# Patient Record
Sex: MALE | Race: WHITE | NOT HISPANIC OR LATINO | URBAN - METROPOLITAN AREA
[De-identification: names, ages, dates, MRNs, and addresses within clinical notes are randomized per-mention and may not be internally consistent; named-entity substitution may affect disease eponyms.]

---

## 2017-05-05 ENCOUNTER — APPOINTMENT (RX ONLY)
Dept: URBAN - METROPOLITAN AREA CLINIC 24 | Facility: CLINIC | Age: 47
Setting detail: DERMATOLOGY
End: 2017-05-05

## 2017-05-05 DIAGNOSIS — L82.1 OTHER SEBORRHEIC KERATOSIS: ICD-10-CM

## 2017-05-05 DIAGNOSIS — D18.0 HEMANGIOMA: ICD-10-CM

## 2017-05-05 DIAGNOSIS — D485 NEOPLASM OF UNCERTAIN BEHAVIOR OF SKIN: ICD-10-CM

## 2017-05-05 DIAGNOSIS — D22 MELANOCYTIC NEVI: ICD-10-CM

## 2017-05-05 DIAGNOSIS — L81.4 OTHER MELANIN HYPERPIGMENTATION: ICD-10-CM

## 2017-05-05 DIAGNOSIS — Z87.2 PERSONAL HISTORY OF DISEASES OF THE SKIN AND SUBCUTANEOUS TISSUE: ICD-10-CM

## 2017-05-05 PROBLEM — D48.5 NEOPLASM OF UNCERTAIN BEHAVIOR OF SKIN: Status: ACTIVE | Noted: 2017-05-05

## 2017-05-05 PROBLEM — F41.9 ANXIETY DISORDER, UNSPECIFIED: Status: ACTIVE | Noted: 2017-05-05

## 2017-05-05 PROBLEM — L55.1 SUNBURN OF SECOND DEGREE: Status: ACTIVE | Noted: 2017-05-05

## 2017-05-05 PROBLEM — D18.01 HEMANGIOMA OF SKIN AND SUBCUTANEOUS TISSUE: Status: ACTIVE | Noted: 2017-05-05

## 2017-05-05 PROBLEM — F32.9 MAJOR DEPRESSIVE DISORDER, SINGLE EPISODE, UNSPECIFIED: Status: ACTIVE | Noted: 2017-05-05

## 2017-05-05 PROBLEM — I10 ESSENTIAL (PRIMARY) HYPERTENSION: Status: ACTIVE | Noted: 2017-05-05

## 2017-05-05 PROBLEM — D22.5 MELANOCYTIC NEVI OF TRUNK: Status: ACTIVE | Noted: 2017-05-05

## 2017-05-05 PROBLEM — L85.3 XEROSIS CUTIS: Status: ACTIVE | Noted: 2017-05-05

## 2017-05-05 PROBLEM — L29.8 OTHER PRURITUS: Status: ACTIVE | Noted: 2017-05-05

## 2017-05-05 PROBLEM — L70.0 ACNE VULGARIS: Status: ACTIVE | Noted: 2017-05-05

## 2017-05-05 PROCEDURE — 11100: CPT

## 2017-05-05 PROCEDURE — ? COUNSELING

## 2017-05-05 PROCEDURE — ? BIOPSY BY SHAVE METHOD

## 2017-05-05 PROCEDURE — ? OTHER

## 2017-05-05 PROCEDURE — 99213 OFFICE O/P EST LOW 20 MIN: CPT | Mod: 25

## 2017-05-05 PROCEDURE — 11101: CPT

## 2017-05-05 ASSESSMENT — LOCATION ZONE DERM
LOCATION ZONE: TRUNK
LOCATION ZONE: NECK
LOCATION ZONE: FACE

## 2017-05-05 ASSESSMENT — LOCATION SIMPLE DESCRIPTION DERM
LOCATION SIMPLE: LEFT UPPER BACK
LOCATION SIMPLE: POSTERIOR NECK
LOCATION SIMPLE: LEFT CHEEK
LOCATION SIMPLE: ABDOMEN

## 2017-05-05 ASSESSMENT — LOCATION DETAILED DESCRIPTION DERM
LOCATION DETAILED: LEFT SUPERIOR LATERAL UPPER BACK
LOCATION DETAILED: RIGHT LATERAL ABDOMEN
LOCATION DETAILED: LEFT LATERAL TRAPEZIAL NECK
LOCATION DETAILED: LEFT RIB CAGE
LOCATION DETAILED: LEFT MID-UPPER BACK
LOCATION DETAILED: LEFT INFERIOR CENTRAL MALAR CHEEK

## 2017-05-05 NOTE — PROCEDURE: BIOPSY BY SHAVE METHOD
Billing Type: Third-Party Bill
Detail Level: Detailed
Biopsy Type: H and E
Type Of Destruction Used: Curettage
Additional Anesthesia Volume In Cc (Will Not Render If 0): 0
Cryotherapy Text: The wound bed was treated with cryotherapy after the biopsy was performed.
Curettage Text: The wound bed was treated with curettage after the biopsy was performed.
Biopsy Method: Dermablade
Bill For Surgical Tray: no
Silver Nitrate Text: The wound bed was treated with silver nitrate after the biopsy was performed.
Anesthesia Type: 1% lidocaine with 1:100,000 epinephrine and a 1:6 solution of 8.4% sodium bicarbonate
Hemostasis: Aluminum Chloride
Anesthesia Volume In Cc: 0.5
Dressing: bandage
Electrodesiccation Text: The wound bed was treated with electrodesiccation after the biopsy was performed.
Wound Care: Petrolatum
Electrodesiccation And Curettage Text: The wound bed was treated with electrodesiccation and curettage after the biopsy was performed.

## 2017-05-05 NOTE — PROCEDURE: OTHER
Other (Free Text): Monitoring nevi on left forearm\\n\\nPhoto of right lower back
Detail Level: Simple
Note Text (......Xxx Chief Complaint.): This diagnosis correlates with the

## 2017-07-06 ENCOUNTER — HOSPITAL ENCOUNTER (OUTPATIENT)
Dept: OCCUPATIONAL MEDICINE | Age: 47
Discharge: HOME | End: 2017-07-06

## 2017-07-06 DIAGNOSIS — T14.90XA INJURY, OTHER AND UNSPECIFIED, UNSPECIFIED SITE: ICD-10-CM

## 2017-10-10 ENCOUNTER — ANESTHESIA EVENT (OUTPATIENT)
Dept: SURGERY | Age: 47
End: 2017-10-10
Payer: OTHER MISCELLANEOUS

## 2017-10-11 ENCOUNTER — APPOINTMENT (OUTPATIENT)
Dept: GENERAL RADIOLOGY | Age: 47
End: 2017-10-11
Attending: EMERGENCY MEDICINE
Payer: OTHER MISCELLANEOUS

## 2017-10-11 ENCOUNTER — HOSPITAL ENCOUNTER (INPATIENT)
Age: 47
LOS: 2 days | Discharge: HOME | DRG: 287 | End: 2017-10-13
Attending: EMERGENCY MEDICINE | Admitting: INTERNAL MEDICINE
Payer: COMMERCIAL

## 2017-10-11 ENCOUNTER — ANESTHESIA (OUTPATIENT)
Dept: SURGERY | Age: 47
End: 2017-10-11
Payer: OTHER MISCELLANEOUS

## 2017-10-11 ENCOUNTER — HOSPITAL ENCOUNTER (OUTPATIENT)
Age: 47
Setting detail: OUTPATIENT SURGERY
Discharge: STILL ADMITTED OR ADMINISTRATIVE | End: 2017-10-11
Attending: ORTHOPAEDIC SURGERY | Admitting: ORTHOPAEDIC SURGERY
Payer: OTHER MISCELLANEOUS

## 2017-10-11 VITALS
RESPIRATION RATE: 15 BRPM | OXYGEN SATURATION: 99 % | SYSTOLIC BLOOD PRESSURE: 107 MMHG | TEMPERATURE: 96.8 F | WEIGHT: 220 LBS | BODY MASS INDEX: 30.68 KG/M2 | HEART RATE: 86 BPM | DIASTOLIC BLOOD PRESSURE: 60 MMHG

## 2017-10-11 DIAGNOSIS — R94.31 ABNORMAL EKG: Primary | ICD-10-CM

## 2017-10-11 PROBLEM — I46.9 ASYSTOLE (HCC): Status: ACTIVE | Noted: 2017-10-11

## 2017-10-11 PROBLEM — I10 HYPERTENSION: Chronic | Status: ACTIVE | Noted: 2017-10-11

## 2017-10-11 PROBLEM — I20.0 UNSTABLE ANGINA (HCC): Status: ACTIVE | Noted: 2017-10-11

## 2017-10-11 PROBLEM — F41.9 ANXIETY: Chronic | Status: ACTIVE | Noted: 2017-10-11

## 2017-10-11 LAB
ANION GAP SERPL CALC-SCNC: 10 MMOL/L (ref 7–16)
ATRIAL RATE: 77 BPM
ATRIAL RATE: 86 BPM
BUN SERPL-MCNC: 15 MG/DL (ref 6–23)
CALCIUM SERPL-MCNC: 8.6 MG/DL (ref 8.3–10.4)
CALCULATED P AXIS, ECG09: 65 DEGREES
CALCULATED P AXIS, ECG09: 68 DEGREES
CALCULATED R AXIS, ECG10: 33 DEGREES
CALCULATED R AXIS, ECG10: 35 DEGREES
CALCULATED T AXIS, ECG11: -103 DEGREES
CALCULATED T AXIS, ECG11: 84 DEGREES
CHLORIDE SERPL-SCNC: 110 MMOL/L (ref 98–107)
CO2 SERPL-SCNC: 24 MMOL/L (ref 21–32)
CREAT SERPL-MCNC: 1.01 MG/DL (ref 0.8–1.5)
DIAGNOSIS, 93000: NORMAL
DIAGNOSIS, 93000: NORMAL
ERYTHROCYTE [DISTWIDTH] IN BLOOD BY AUTOMATED COUNT: 12.9 % (ref 11.9–14.6)
GLUCOSE SERPL-MCNC: 120 MG/DL (ref 65–100)
HCT VFR BLD AUTO: 43 % (ref 41.1–50.3)
HGB BLD-MCNC: 14.9 G/DL (ref 13.6–17.2)
MAGNESIUM SERPL-MCNC: 2.3 MG/DL (ref 1.8–2.4)
MCH RBC QN AUTO: 33.8 PG (ref 26.1–32.9)
MCHC RBC AUTO-ENTMCNC: 34.7 G/DL (ref 31.4–35)
MCV RBC AUTO: 97.5 FL (ref 79.6–97.8)
P-R INTERVAL, ECG05: 136 MS
P-R INTERVAL, ECG05: 142 MS
PLATELET # BLD AUTO: 178 K/UL (ref 150–450)
PMV BLD AUTO: 10.4 FL (ref 10.8–14.1)
POTASSIUM SERPL-SCNC: 3.7 MMOL/L (ref 3.5–5.1)
Q-T INTERVAL, ECG07: 386 MS
Q-T INTERVAL, ECG07: 388 MS
QRS DURATION, ECG06: 104 MS
QRS DURATION, ECG06: 104 MS
QTC CALCULATION (BEZET), ECG08: 439 MS
QTC CALCULATION (BEZET), ECG08: 461 MS
RBC # BLD AUTO: 4.41 M/UL (ref 4.23–5.67)
SODIUM SERPL-SCNC: 144 MMOL/L (ref 136–145)
TROPONIN I SERPL-MCNC: 0.03 NG/ML (ref 0.02–0.05)
VENTRICULAR RATE, ECG03: 77 BPM
VENTRICULAR RATE, ECG03: 86 BPM
WBC # BLD AUTO: 8.2 K/UL (ref 4.3–11.1)

## 2017-10-11 PROCEDURE — 77030000032 HC CUF TRNQT ZIMM -B: Performed by: ORTHOPAEDIC SURGERY

## 2017-10-11 PROCEDURE — 77030003666 HC NDL SPINAL BD -A: Performed by: ORTHOPAEDIC SURGERY

## 2017-10-11 PROCEDURE — 74011250636 HC RX REV CODE- 250/636: Performed by: INTERNAL MEDICINE

## 2017-10-11 PROCEDURE — 83735 ASSAY OF MAGNESIUM: CPT | Performed by: EMERGENCY MEDICINE

## 2017-10-11 PROCEDURE — 74011636320 HC RX REV CODE- 636/320: Performed by: INTERNAL MEDICINE

## 2017-10-11 PROCEDURE — 74011250636 HC RX REV CODE- 250/636

## 2017-10-11 PROCEDURE — 80048 BASIC METABOLIC PNL TOTAL CA: CPT | Performed by: EMERGENCY MEDICINE

## 2017-10-11 PROCEDURE — 74011250637 HC RX REV CODE- 250/637: Performed by: ANESTHESIOLOGY

## 2017-10-11 PROCEDURE — 77030006668 HC BLD SHV MENSCS STRY -B: Performed by: ORTHOPAEDIC SURGERY

## 2017-10-11 PROCEDURE — 77030018836 HC SOL IRR NACL ICUM -A: Performed by: ORTHOPAEDIC SURGERY

## 2017-10-11 PROCEDURE — C1894 INTRO/SHEATH, NON-LASER: HCPCS

## 2017-10-11 PROCEDURE — 74011000250 HC RX REV CODE- 250

## 2017-10-11 PROCEDURE — 77030015766

## 2017-10-11 PROCEDURE — 77030019605: Performed by: ORTHOPAEDIC SURGERY

## 2017-10-11 PROCEDURE — B2111ZZ FLUOROSCOPY OF MULTIPLE CORONARY ARTERIES USING LOW OSMOLAR CONTRAST: ICD-10-PCS | Performed by: INTERNAL MEDICINE

## 2017-10-11 PROCEDURE — 74011250636 HC RX REV CODE- 250/636: Performed by: ORTHOPAEDIC SURGERY

## 2017-10-11 PROCEDURE — 74011000250 HC RX REV CODE- 250: Performed by: INTERNAL MEDICINE

## 2017-10-11 PROCEDURE — 77030004534 HC CATH ANGI DX INFN CARD -A

## 2017-10-11 PROCEDURE — 77030029997 HC DEV COM RDL R BND TELE -B

## 2017-10-11 PROCEDURE — 93458 L HRT ARTERY/VENTRICLE ANGIO: CPT

## 2017-10-11 PROCEDURE — 76060000032 HC ANESTHESIA 0.5 TO 1 HR: Performed by: ORTHOPAEDIC SURGERY

## 2017-10-11 PROCEDURE — 76210000063 HC OR PH I REC FIRST 0.5 HR: Performed by: ORTHOPAEDIC SURGERY

## 2017-10-11 PROCEDURE — 74011250637 HC RX REV CODE- 250/637: Performed by: INTERNAL MEDICINE

## 2017-10-11 PROCEDURE — 74011250636 HC RX REV CODE- 250/636: Performed by: ANESTHESIOLOGY

## 2017-10-11 PROCEDURE — 99285 EMERGENCY DEPT VISIT HI MDM: CPT | Performed by: EMERGENCY MEDICINE

## 2017-10-11 PROCEDURE — 85027 COMPLETE CBC AUTOMATED: CPT | Performed by: EMERGENCY MEDICINE

## 2017-10-11 PROCEDURE — 77030002933 HC SUT MCRYL J&J -A: Performed by: ORTHOPAEDIC SURGERY

## 2017-10-11 PROCEDURE — 93005 ELECTROCARDIOGRAM TRACING: CPT | Performed by: ANESTHESIOLOGY

## 2017-10-11 PROCEDURE — C8929 TTE W OR WO FOL WCON,DOPPLER: HCPCS

## 2017-10-11 PROCEDURE — 74011250637 HC RX REV CODE- 250/637: Performed by: PHYSICIAN ASSISTANT

## 2017-10-11 PROCEDURE — 93005 ELECTROCARDIOGRAM TRACING: CPT | Performed by: EMERGENCY MEDICINE

## 2017-10-11 PROCEDURE — 65660000000 HC RM CCU STEPDOWN

## 2017-10-11 PROCEDURE — 76010000138 HC OR TIME 0.5 TO 1 HR: Performed by: ORTHOPAEDIC SURGERY

## 2017-10-11 PROCEDURE — 77030020143 HC AIRWY LARYN INTUB CGAS -A: Performed by: ANESTHESIOLOGY

## 2017-10-11 PROCEDURE — B2151ZZ FLUOROSCOPY OF LEFT HEART USING LOW OSMOLAR CONTRAST: ICD-10-PCS | Performed by: INTERNAL MEDICINE

## 2017-10-11 PROCEDURE — 99152 MOD SED SAME PHYS/QHP 5/>YRS: CPT

## 2017-10-11 PROCEDURE — 84484 ASSAY OF TROPONIN QUANT: CPT | Performed by: PHYSICIAN ASSISTANT

## 2017-10-11 PROCEDURE — 4A023N7 MEASUREMENT OF CARDIAC SAMPLING AND PRESSURE, LEFT HEART, PERCUTANEOUS APPROACH: ICD-10-PCS | Performed by: INTERNAL MEDICINE

## 2017-10-11 PROCEDURE — 71010 XR CHEST PORT: CPT

## 2017-10-11 PROCEDURE — C1769 GUIDE WIRE: HCPCS

## 2017-10-11 RX ORDER — ESMOLOL HYDROCHLORIDE 10 MG/ML
INJECTION INTRAVENOUS AS NEEDED
Status: DISCONTINUED | OUTPATIENT
Start: 2017-10-11 | End: 2017-10-11 | Stop reason: HOSPADM

## 2017-10-11 RX ORDER — SODIUM CHLORIDE, SODIUM LACTATE, POTASSIUM CHLORIDE, CALCIUM CHLORIDE 600; 310; 30; 20 MG/100ML; MG/100ML; MG/100ML; MG/100ML
75 INJECTION, SOLUTION INTRAVENOUS CONTINUOUS
Status: DISCONTINUED | OUTPATIENT
Start: 2017-10-11 | End: 2017-10-11 | Stop reason: HOSPADM

## 2017-10-11 RX ORDER — FENTANYL CITRATE 50 UG/ML
25-100 INJECTION, SOLUTION INTRAMUSCULAR; INTRAVENOUS
Status: DISCONTINUED | OUTPATIENT
Start: 2017-10-11 | End: 2017-10-12

## 2017-10-11 RX ORDER — ONDANSETRON 2 MG/ML
4 INJECTION INTRAMUSCULAR; INTRAVENOUS
Status: DISCONTINUED | OUTPATIENT
Start: 2017-10-11 | End: 2017-10-13 | Stop reason: HOSPADM

## 2017-10-11 RX ORDER — PROPOFOL 10 MG/ML
INJECTION, EMULSION INTRAVENOUS AS NEEDED
Status: DISCONTINUED | OUTPATIENT
Start: 2017-10-11 | End: 2017-10-11 | Stop reason: HOSPADM

## 2017-10-11 RX ORDER — DIAZEPAM 5 MG/1
5 TABLET ORAL ONCE
Status: ACTIVE | OUTPATIENT
Start: 2017-10-11 | End: 2017-10-11

## 2017-10-11 RX ORDER — MORPHINE SULFATE 2 MG/ML
2 INJECTION, SOLUTION INTRAMUSCULAR; INTRAVENOUS
Status: DISCONTINUED | OUTPATIENT
Start: 2017-10-11 | End: 2017-10-13 | Stop reason: HOSPADM

## 2017-10-11 RX ORDER — HEPARIN SODIUM 200 [USP'U]/100ML
25 INJECTION, SOLUTION INTRAVENOUS CONTINUOUS
Status: DISCONTINUED | OUTPATIENT
Start: 2017-10-11 | End: 2017-10-11

## 2017-10-11 RX ORDER — VALSARTAN 160 MG/1
160 TABLET ORAL DAILY
Status: DISCONTINUED | OUTPATIENT
Start: 2017-10-11 | End: 2017-10-13 | Stop reason: HOSPADM

## 2017-10-11 RX ORDER — FENTANYL CITRATE 50 UG/ML
INJECTION, SOLUTION INTRAMUSCULAR; INTRAVENOUS AS NEEDED
Status: DISCONTINUED | OUTPATIENT
Start: 2017-10-11 | End: 2017-10-11 | Stop reason: HOSPADM

## 2017-10-11 RX ORDER — CITALOPRAM 10 MG/1
10 TABLET ORAL DAILY
Status: DISCONTINUED | OUTPATIENT
Start: 2017-10-11 | End: 2017-10-13 | Stop reason: HOSPADM

## 2017-10-11 RX ORDER — MIDAZOLAM HYDROCHLORIDE 1 MG/ML
2 INJECTION, SOLUTION INTRAMUSCULAR; INTRAVENOUS ONCE
Status: DISCONTINUED | OUTPATIENT
Start: 2017-10-11 | End: 2017-10-11 | Stop reason: HOSPADM

## 2017-10-11 RX ORDER — CEFAZOLIN SODIUM IN 0.9 % NACL 2 G/50 ML
2 INTRAVENOUS SOLUTION, PIGGYBACK (ML) INTRAVENOUS ONCE
Status: COMPLETED | OUTPATIENT
Start: 2017-10-11 | End: 2017-10-11

## 2017-10-11 RX ORDER — ACETAMINOPHEN 325 MG/1
650 TABLET ORAL
Status: DISCONTINUED | OUTPATIENT
Start: 2017-10-11 | End: 2017-10-13 | Stop reason: HOSPADM

## 2017-10-11 RX ORDER — MIDAZOLAM HYDROCHLORIDE 1 MG/ML
2 INJECTION, SOLUTION INTRAMUSCULAR; INTRAVENOUS
Status: DISCONTINUED | OUTPATIENT
Start: 2017-10-11 | End: 2017-10-11 | Stop reason: HOSPADM

## 2017-10-11 RX ORDER — LIDOCAINE HYDROCHLORIDE 20 MG/ML
1-20 INJECTION, SOLUTION INFILTRATION; PERINEURAL ONCE
Status: COMPLETED | OUTPATIENT
Start: 2017-10-11 | End: 2017-10-11

## 2017-10-11 RX ORDER — IBUPROFEN 400 MG/1
800 TABLET ORAL
Status: DISCONTINUED | OUTPATIENT
Start: 2017-10-11 | End: 2017-10-13 | Stop reason: HOSPADM

## 2017-10-11 RX ORDER — ACETAMINOPHEN 500 MG
1000 TABLET ORAL ONCE
Status: COMPLETED | OUTPATIENT
Start: 2017-10-11 | End: 2017-10-11

## 2017-10-11 RX ORDER — NALOXONE HYDROCHLORIDE 0.4 MG/ML
0.2 INJECTION, SOLUTION INTRAMUSCULAR; INTRAVENOUS; SUBCUTANEOUS AS NEEDED
Status: DISCONTINUED | OUTPATIENT
Start: 2017-10-11 | End: 2017-10-11 | Stop reason: HOSPADM

## 2017-10-11 RX ORDER — SODIUM CHLORIDE 0.9 % (FLUSH) 0.9 %
5-10 SYRINGE (ML) INJECTION AS NEEDED
Status: DISCONTINUED | OUTPATIENT
Start: 2017-10-11 | End: 2017-10-11 | Stop reason: HOSPADM

## 2017-10-11 RX ORDER — FENTANYL CITRATE 50 UG/ML
100 INJECTION, SOLUTION INTRAMUSCULAR; INTRAVENOUS ONCE
Status: DISCONTINUED | OUTPATIENT
Start: 2017-10-11 | End: 2017-10-11 | Stop reason: HOSPADM

## 2017-10-11 RX ORDER — HYDROMORPHONE HYDROCHLORIDE 2 MG/ML
0.5 INJECTION, SOLUTION INTRAMUSCULAR; INTRAVENOUS; SUBCUTANEOUS
Status: DISCONTINUED | OUTPATIENT
Start: 2017-10-11 | End: 2017-10-11 | Stop reason: HOSPADM

## 2017-10-11 RX ORDER — GUAIFENESIN 100 MG/5ML
81 LIQUID (ML) ORAL ONCE
Status: COMPLETED | OUTPATIENT
Start: 2017-10-11 | End: 2017-10-11

## 2017-10-11 RX ORDER — GUAIFENESIN 100 MG/5ML
81 LIQUID (ML) ORAL DAILY
Status: DISCONTINUED | OUTPATIENT
Start: 2017-10-12 | End: 2017-10-13 | Stop reason: HOSPADM

## 2017-10-11 RX ORDER — AMLODIPINE BESYLATE 5 MG/1
5 TABLET ORAL DAILY
Status: DISCONTINUED | OUTPATIENT
Start: 2017-10-12 | End: 2017-10-13 | Stop reason: HOSPADM

## 2017-10-11 RX ORDER — MIDAZOLAM HYDROCHLORIDE 1 MG/ML
1-6 INJECTION, SOLUTION INTRAMUSCULAR; INTRAVENOUS
Status: DISCONTINUED | OUTPATIENT
Start: 2017-10-11 | End: 2017-10-12

## 2017-10-11 RX ORDER — LIDOCAINE HYDROCHLORIDE 10 MG/ML
0.1 INJECTION INFILTRATION; PERINEURAL AS NEEDED
Status: DISCONTINUED | OUTPATIENT
Start: 2017-10-11 | End: 2017-10-11 | Stop reason: HOSPADM

## 2017-10-11 RX ORDER — NITROGLYCERIN 0.4 MG/1
0.4 TABLET SUBLINGUAL
Status: DISCONTINUED | OUTPATIENT
Start: 2017-10-11 | End: 2017-10-13 | Stop reason: HOSPADM

## 2017-10-11 RX ORDER — LIDOCAINE HYDROCHLORIDE 20 MG/ML
INJECTION, SOLUTION EPIDURAL; INFILTRATION; INTRACAUDAL; PERINEURAL AS NEEDED
Status: DISCONTINUED | OUTPATIENT
Start: 2017-10-11 | End: 2017-10-11 | Stop reason: HOSPADM

## 2017-10-11 RX ORDER — SODIUM CHLORIDE 0.9 % (FLUSH) 0.9 %
5-10 SYRINGE (ML) INJECTION EVERY 8 HOURS
Status: DISCONTINUED | OUTPATIENT
Start: 2017-10-11 | End: 2017-10-11 | Stop reason: HOSPADM

## 2017-10-11 RX ORDER — ATROPINE SULFATE 0.4 MG/ML
INJECTION, SOLUTION ENDOTRACHEAL; INTRAMEDULLARY; INTRAMUSCULAR; INTRAVENOUS; SUBCUTANEOUS AS NEEDED
Status: DISCONTINUED | OUTPATIENT
Start: 2017-10-11 | End: 2017-10-11 | Stop reason: HOSPADM

## 2017-10-11 RX ORDER — OXYCODONE HYDROCHLORIDE 5 MG/1
5 TABLET ORAL
Status: DISCONTINUED | OUTPATIENT
Start: 2017-10-11 | End: 2017-10-11 | Stop reason: HOSPADM

## 2017-10-11 RX ORDER — EPINEPHRINE 1 MG/ML
INJECTION, SOLUTION, CONCENTRATE INTRAVENOUS AS NEEDED
Status: DISCONTINUED | OUTPATIENT
Start: 2017-10-11 | End: 2017-10-11 | Stop reason: HOSPADM

## 2017-10-11 RX ORDER — HYDROCODONE BITARTRATE AND ACETAMINOPHEN 5; 325 MG/1; MG/1
1 TABLET ORAL
Status: DISCONTINUED | OUTPATIENT
Start: 2017-10-11 | End: 2017-10-13 | Stop reason: HOSPADM

## 2017-10-11 RX ADMIN — ASPIRIN 81 MG 81 MG: 81 TABLET ORAL at 09:22

## 2017-10-11 RX ADMIN — PERFLUTREN 1 ML: 6.52 INJECTION, SUSPENSION INTRAVENOUS at 13:00

## 2017-10-11 RX ADMIN — ATROPINE SULFATE 0.4 MG: 0.4 INJECTION, SOLUTION ENDOTRACHEAL; INTRAMEDULLARY; INTRAMUSCULAR; INTRAVENOUS; SUBCUTANEOUS at 07:06

## 2017-10-11 RX ADMIN — FENTANYL CITRATE 50 MCG: 50 INJECTION, SOLUTION INTRAMUSCULAR; INTRAVENOUS at 06:53

## 2017-10-11 RX ADMIN — ESMOLOL HYDROCHLORIDE 10 MG: 10 INJECTION INTRAVENOUS at 07:14

## 2017-10-11 RX ADMIN — MIDAZOLAM HYDROCHLORIDE 2 MG: 1 INJECTION, SOLUTION INTRAMUSCULAR; INTRAVENOUS at 09:51

## 2017-10-11 RX ADMIN — IBUPROFEN 800 MG: 400 TABLET, FILM COATED ORAL at 22:47

## 2017-10-11 RX ADMIN — PROPOFOL 100 MG: 10 INJECTION, EMULSION INTRAVENOUS at 06:54

## 2017-10-11 RX ADMIN — IBUPROFEN 800 MG: 400 TABLET, FILM COATED ORAL at 14:48

## 2017-10-11 RX ADMIN — ACETAMINOPHEN 650 MG: 325 TABLET ORAL at 20:39

## 2017-10-11 RX ADMIN — ACETAMINOPHEN 1000 MG: 500 TABLET, FILM COATED ORAL at 06:08

## 2017-10-11 RX ADMIN — PROPOFOL 200 MG: 10 INJECTION, EMULSION INTRAVENOUS at 06:53

## 2017-10-11 RX ADMIN — HEPARIN SODIUM 2 ML: 10000 INJECTION, SOLUTION INTRAVENOUS; SUBCUTANEOUS at 09:56

## 2017-10-11 RX ADMIN — FENTANYL CITRATE 50 MCG: 50 INJECTION, SOLUTION INTRAMUSCULAR; INTRAVENOUS at 09:51

## 2017-10-11 RX ADMIN — LIDOCAINE HYDROCHLORIDE 50 MG: 20 INJECTION, SOLUTION EPIDURAL; INFILTRATION; INTRACAUDAL; PERINEURAL at 06:53

## 2017-10-11 RX ADMIN — IOPAMIDOL 85 ML: 755 INJECTION, SOLUTION INTRAVENOUS at 10:03

## 2017-10-11 RX ADMIN — CEFAZOLIN 2 G: 1 INJECTION, POWDER, FOR SOLUTION INTRAMUSCULAR; INTRAVENOUS; PARENTERAL at 06:42

## 2017-10-11 RX ADMIN — SODIUM CHLORIDE, SODIUM LACTATE, POTASSIUM CHLORIDE, AND CALCIUM CHLORIDE 75 ML/HR: 600; 310; 30; 20 INJECTION, SOLUTION INTRAVENOUS at 06:08

## 2017-10-11 RX ADMIN — HEPARIN SODIUM 25 ML/HR: 200 INJECTION, SOLUTION INTRAVENOUS at 09:40

## 2017-10-11 RX ADMIN — LIDOCAINE HYDROCHLORIDE 60 MG: 20 INJECTION, SOLUTION INFILTRATION; PERINEURAL at 09:50

## 2017-10-11 RX ADMIN — EPINEPHRINE 1 MG: 1 INJECTION, SOLUTION, CONCENTRATE INTRAVENOUS at 07:06

## 2017-10-11 NOTE — H&P
[facility] CARDIOLOGY History &Physical                   Primary Care Physician: Dr. Jb Gill    Admitting Physician: Dr. Ke Pruett:     Patient is a 56 y.o. male who presented for knee arthroscopy today. During the procedure, it was noted that his knee kicked. He was then noted to be bradycardic and then had asystole. Chest compressions were started. He was given medication by anesthesia and heart rate improved. Chest compressions were stopped. The case was stopped and he was taken to recovery. 12 lead EKG was obtained that showed septal/anterior ST elevation. He was transported to the ER. He denied any chest pain or dyspnea. Repeat EKG was improved but continued to show mild ST elevation. Troponin is pending. He has no prior history of CAD. He reports a normal stress test approximately 6-7 years ago. He does have FH of premature CAD.        Past history includes HTN, anxiety    Past Medical History:   Diagnosis Date    Anxiety     managed well with Celexa    Former cigarette smoker     GERD (gastroesophageal reflux disease)     PRN OTC     History of kidney stones     X3- naturally passed    Hypertension     controlled well with meds    Left knee pain     Psychiatric disorder     Seasonal allergic rhinitis     Allegra PRN    Unspecified sleep apnea     complaint with CPAP      Past Surgical History:   Procedure Laterality Date    HX COLONOSCOPY      HX ENDOSCOPY      HX HEENT      adenoid, polyps from throat removed     HX TONSILLECTOMY  at age of 7      Allergies   Allergen Reactions    Pcn [Penicillins] Rash     Social History   Substance Use Topics    Smoking status: Former Smoker     Packs/day: 1.00     Years: 20.00    Smokeless tobacco: Never Used      Comment: quit 2015    Alcohol use Yes      Comment: socially      FH:   Family History   Problem Relation Age of Onset    Hypertension Mother     Arthritis-osteo Mother     Diabetes Father     Heart Disease Father     No Known Problems Sister     No Known Problems Brother           Review of Systems   Constitution: Negative for chills, fever, weakness, malaise/fatigue, weight gain and weight loss. HENT: Negative for ear pain, hearing loss, nosebleeds, sore throat and tinnitus. Eyes: Negative for blurred vision, vision loss in left eye and vision loss in right eye. Cardiovascular: Negative for chest pain, dyspnea on exertion, leg swelling, near-syncope, orthopnea, palpitations, paroxysmal nocturnal dyspnea and syncope. Respiratory: Negative for cough, hemoptysis, shortness of breath, sputum production and wheezing. Endocrine: Negative for cold intolerance, heat intolerance and polydipsia. Hematologic/Lymphatic: Does not bruise/bleed easily. Skin: Negative for color change and rash. Musculoskeletal: Negative for back pain, joint pain, joint swelling and myalgias. Knee pain   Gastrointestinal: Negative for abdominal pain, constipation, diarrhea, dysphagia, heartburn, hematemesis, melena, nausea and vomiting. Genitourinary: Negative for dysuria, frequency, hematuria and urgency. Neurological: Negative for difficulty with concentration, dizziness, headaches, light-headedness, numbness, paresthesias, seizures and vertigo. Psychiatric/Behavioral: Negative for altered mental status and depression.          Objective:       Visit Vitals    /80    Pulse 81    Temp 97.7 °F (36.5 °C)    Resp 16    Ht 5' 11\" (1.803 m)    Wt 99.8 kg (220 lb)    SpO2 94%    BMI 30.68 kg/m2       Physical Exam:  General: Well Developed, Well Nourished, No Acute Distress  HEENT: pupils equal and round, no abnormalities noted  Neck: supple, no JVD  Heart: S1S2 with RRR  Lungs: Clear throughout auscultation bilaterally  Abd: soft, nontender, nondistended, with good bowel sounds  Ext: warm, no edema, calves supple/nontender, pulses 2+ bilaterally  Skin: warm and dry  Psychiatric: Normal mood and affect  Neurologic: Alert and oriented X 3      ECG: sinus rhythm, ST elevation in septal/anterior leads    Data Review: pending    CXR: Pending    Assessment/Plan:     Abnormal EKG (10/11/2017)  Concerning for ischemia, will proceed with LHC/possible PCI this morning    Active Problems:    Asystole ([address] (10/11/2017)  This occurred in the OR under anesthesia, ?vagal episode, LHC as above, monitor on telemetry      Anxiety (10/11/2017)    Continue Celexa      Hypertension (10/11/2017)  Continue home meds and monitor     Knee pain  Arthroscopy case was stopped given above events, will readdress after [address] PA-C  10/11/2017  8:15 AM

## 2017-10-11 NOTE — IP AVS SNAPSHOT
[address] 
[phone] Patient: Chris Lutz MRN: [id number] SUQ:[birth year] Current Discharge Medication List  
  
CONTINUE these medications which have NOT CHANGED Dose & Instructions Dispensing Information Comments Morning Noon Evening Bedtime ADVIL 200 mg tablet Generic drug:  ibuprofen Your next dose is:  As needed Dose:  200 mg Take 200 mg by mouth as needed for Pain. Refills:  0 ALLEGRA 180 mg tablet Generic drug:  fexofenadine Your next dose is:  As needed Take  by mouth daily as needed for Allergies. Refills:  0  
     
   
   
   
  
 citalopram 10 mg tablet Commonly known as:  Hernandez Angeau Your next dose is:  10-14 Dose:  10 mg Take 10 mg by mouth daily. Indications: anxiety Refills:  0 EXFORGE 5-160 mg per tablet Generic drug:  amLODIPine-valsartan Your next dose is:  10-14 Dose:  1 Tab Take 1 Tab by mouth daily. Indications: hypertension Refills:  0

## 2017-10-11 NOTE — PROGRESS NOTES
TRANSFER - OUT REPORT:    Verbal report given to Jose Jacob RN(name) on Rosy Tolbert  being transferred to tele(unit) for routine progression of care       Report consisted of patients Situation, Background, Assessment and   Recommendations(SBAR). Information from the following report(s) Procedure Summary was reviewed with the receiving nurse. Lines:   Peripheral IV 10/11/17 Right Hand (Active)   Site Assessment Clean, dry, & intact 10/11/2017  7:44 AM   Phlebitis Assessment 0 10/11/2017  7:44 AM   Infiltration Assessment 0 10/11/2017  7:44 AM   Dressing Status Clean, dry, & intact 10/11/2017  7:44 AM   Dressing Type Tape;Transparent 10/11/2017  7:44 AM   Hub Color/Line Status Pink; Infusing 10/11/2017  7:44 AM        Opportunity for questions and clarification was provided.       Patient transported with:   [employer]

## 2017-10-11 NOTE — ANESTHESIA POSTPROCEDURE EVALUATION
Post-Anesthesia Evaluation and Assessment    Patient: Kike Bravo MRN: [id number]  SSN: [id number]    YOB: 1970  Age: 56 y.o. Sex: male       Cardiovascular Function/Vital Signs  Visit Vitals    /57    Pulse 90    Temp 36 °C (96.8 °F)    Resp 16    Wt 99.8 kg (220 lb)    SpO2 97%    BMI 30.68 kg/m2       Patient is status post general anesthesia for Procedure(s):  LEFT KNEE DIAGNOSTIC ARTHROSCOPY   . Nausea/Vomiting: None    Postoperative hydration reviewed and adequate. Pain:  Pain Scale 1: (P) Numeric (0 - 10) (10/11/17 0737)  Pain Intensity 1: (P) 4 (10/11/17 0737)   Managed    Neurological Status:   Neuro (WDL): Within Defined Limits (10/11/17 0600)   At baseline    Mental Status and Level of Consciousness: Arousable    Pulmonary Status:   O2 Device: (P) Oxygen mask (10/11/17 0734)   Adequate oxygenation and airway patent    Complications related to anesthesia: ACLS in [address] assessment completed. Emergently to ER. ER physician and cardiologist aware.      Signed By: Pinky Gomez MD     October 11, 2017

## 2017-10-11 NOTE — H&P
Outpatient Surgery History and Physical:  Brady Bustamante was seen and examined. CHIEF COMPLAINT:   Left knee pain. PE:     Visit Vitals    /87 (BP 1 Location: Right arm, BP Patient Position: Sitting)    Pulse 70    Temp 98.6 °F (37 °C)    Resp 18    Wt 99.8 kg (220 lb)    SpO2 95%    BMI 30.68 kg/m2       Heart:   Regular rhythm      Lungs:  Are clear      Past Medical History: There are no active problems to display for this patient. Surgical History:   Past Surgical History:   Procedure Laterality Date    HX COLONOSCOPY      HX ENDOSCOPY      HX HEENT      adenoid, polyps from throat removed     HX TONSILLECTOMY  at age of 7       Social History: Patient  reports that he has quit smoking. He has a 20.00 pack-year smoking history. He has never used smokeless tobacco. He reports that he drinks alcohol. He reports that he does not use illicit drugs. Family History:   Family History   Problem Relation Age of Onset    Hypertension Mother     Arthritis-osteo Mother     Diabetes Father     Heart Disease Father     No Known Problems Sister     No Known Problems Brother        Allergies: Reviewed per EMR  Allergies   Allergen Reactions    Pcn [Penicillins] Rash       Medications:    No current facility-administered medications on file prior to encounter. Current Outpatient Prescriptions on File Prior to Encounter   Medication Sig    ibuprofen (ADVIL) 200 mg tablet Take 200 mg by mouth as needed for Pain.  fexofenadine (ALLEGRA) 180 mg tablet Take  by mouth daily as needed for Allergies.  citalopram (CELEXA) 10 mg tablet Take 10 mg by mouth daily. Indications: anxiety    amLODIPine-valsartan (EXFORGE) 5-160 mg per tablet Take 1 Tab by mouth daily. Indications: hypertension       The surgery is planned for the left knee. History and physical has been reviewed. The patient has been examined.  There have been no significant clinical changes since the completion of the originally dated History and Physical.  Patient identified by surgeon; surgical site was confirmed by patient and surgeon. The patient is here today for outpatient surgery. I have examined the patient, no changes are noted in the patient's medical status. Necessity for the procedure/care is still present and the history and physical above is current. See the office notes for the full long term history of the problem. Please see the recent office notes for the musculoskeletal examination.     Signed By: Army Jolynn MD     October 11, 2017 6:34 AM

## 2017-10-11 NOTE — ROUTINE PROCESS
TRANSFER - OUT REPORT:    Verbal report given to Carlyle Cuba RN (name) on Nataly Nose  being transferred to [address] [place] [address] (unit) for routine progression of care       Report consisted of patients Situation, Background, Assessment and   Recommendations(SBAR). Information from the following report(s) Procedure Summary, MAR and Recent Results was reviewed with the receiving nurse. Lines:   Peripheral IV 10/11/17 Right Hand (Active)   Site Assessment Clean, dry, & intact 10/11/2017  7:44 AM   Phlebitis Assessment 0 10/11/2017  7:44 AM   Infiltration Assessment 0 10/11/2017  7:44 AM   Dressing Status Clean, dry, & intact 10/11/2017  7:44 AM   Dressing Type Tape;Transparent 10/11/2017  7:44 AM   Hub Color/Line Status Pink; Infusing 10/11/2017  7:44 AM        Opportunity for questions and clarification was provided.       Patient transported with:   [facility] w/ Dr Cevallos Sport cath  R band to right radial w/ 13 mls at 1010  Versed 2mg IV  Fentanyl 50 mcg IV

## 2017-10-11 NOTE — ED PROVIDER NOTES
52-year-old male was here for knee scope. As surgery was starting patient had episode of bradycardia and hypotension. Became asystolic. He was given epi atropine and bicarbonate. Recovered. EKG abnormal periods patient sent from the OR to the ED for cardiology evaluation    At this time patient is awake alert oriented answering questions falling commands. Patient movements are intact. Extraocular movements are intact. Respirations are easy and nonlabored. Radial Pulses are equal.     [address] is at the bedside. Plan is take the patient of the Lab for further evaluation.     Lewis Burk MD; 10/11/2017 @8:02 AM===========================================

## 2017-10-11 NOTE — PROGRESS NOTES
TRANSFER - IN REPORT:    Verbal report received from Donn Grove RN(name) on Annie Anna  being received from cath lab(unit) for routine progression of care      Report consisted of patients Situation, Background, Assessment and   Recommendations(SBAR). Information from the following report(s) Procedure Summary was reviewed with the receiving nurse. Opportunity for questions and clarification was provided. Assessment completed upon patients arrival to unit and care assumed.

## 2017-10-11 NOTE — OP NOTES
Operative Note    10/11/2017     Preoperative diagnosis:  Other tear of medial meniscus of left knee, unspecified whether old or current tear, initial encounter [S86.504D]    Postoperative diagnosis: Left Knee    Surgeon(s) and Role:     * Denver Grapes, MD - Primary     Assistant: none      Anesthesia: General    Antibiotics: Ancef 2 gram IV    Procedures:  Procedure(s):  LEFT KNEE DIAGNOSTIC ARTHROSCOPY   [address]    Findings:  1. EUA -   - lachman's and - pivot shift. Stable to varus and valgus. 2. PFJ - Normal patella and trochlea. The medial and lateral gutters appeared normal. Initial observation of the notch did not show any other abnormality but prior to being able to probe the ACL and visualize more of the joint the case had to be stopped. Indications / Consent:   this is a patient with symptoms compatible with a medial meniscus tear. After previous discussions and treatments using both conservative and/or non-operative treatment options the patient elected to proceed with surgery due to continued symptoms. A review of the risks and benefits, including but not limited to infection, stiffness, injury to nerves and vessels, DVT, PE, MI, need for further operations and other anesthesia related risks was performed with the patient. After this review and the review of the likely outcome and potential complications of the procedure, preoperative verbal and written consents were obtained. The operative procedure and postoperative course were discussed with the patient in detail and the extremity was marked by the patient and myself. Procedure:     the patient was given their anesthetic, placed in a supine position, an EUA was performed and noted above. A lateral post was placed adjacent to the operative leg. The contralateral leg was padded appropriately and lay on the operating table. The surgical leg was prepped with ChloraPrep and draped in the standard fashion.      Prior to the beginning of the procedure, a time-out was performed for correct surgical site identification as was marked during the pre-operative meeting. This was confirmed using the written consent and history/physical. Time-out for antibiotic dosing, timing and selection was also performed. An esmarch was used to exsanguinate the leg and the tourniquet was inflated to 275 mmhg. An incision was made laterally and the scope was introduced anterolaterally. The superior pouch was visualized and the scope was used to look into the medial and lateral gutters without notable abnormality. Pictures of the patella and the trochlea were taken. The scope was then placed into the notch. An anteromedial portal was developed with the use of spinal needle localization. His knee kicked and I told anesthesia. At that point she said his heart rate was low and shortly after he went into asystole. Chest compressions were started immediately and a code stat was called. While I performed compressions a sterile dressing was placed on the knee and the tourniquet was deflated. Once the anesthesiologist entered the room he administered medications and the patient's heart rate improved so that compressions were stopped. He stabilized further and was slowly awakened. He was able to be extubated and was responding well. The patient was returned to the recovery room in stable condition. He was able to communicate and was breathing in oxygen on his own. Post-operative plan: He will go to recovery where further cardiac workup will be performed. Estimated Blood Loss:   1 ml    Fluids:    See anesthesia record.     Implant: * No implants in log *    Closure: Primary    Complications: cardiac arhythmia     Signed By: Emelia Huerta MD

## 2017-10-11 NOTE — PERIOP NOTES
Anesthesiologist Event Note    57 yo M with PMHx of HTN, tobacco abuse and obesity presenting for knee arthroscopy. Pt tolerated induction without issue. Case proceeded without issue. Small incisions x 2 were made and scope was inserted by Dr. Michele Gil. After a few minutes the pt was noted by the CRNA to have decreased HR to 36 bpm. Atropine and Ephedrine did not improve the HR and pt progressed to asystole. ACLS was started and 1 mg of Epinephrine was administered. After approx 1.5-2 min of compressions a pulse became palpable and pt began to make a respiratory effort. LMA was removed and pt taken to PACU. 12 Lead EKG revealed possible ischemic changes. Cardiology was consulted. Pt stable.

## 2017-10-11 NOTE — PROCEDURES
Heather Miller 45       Name:  Sebastien Guajardo   MR#:  [id number]   :  1970   Account #:  [de-identified]   Date of Adm:  10/11/2017       PROCEDURES:   1. Left heart catheterization. 2. Selective coronary angiography. 3. Left ventriculogram.    INDICATIONS: The patient had a bradycardic, possible pause   asystolic arrest in OR with the EKG changes afterwards. He had   some chest pain. He is referred for catheterization. Right radial artery was used. TIG 4 and pigtail were used. FINDINGS:   Left ventriculogram done in [facility] projection shows LVEDP of 15,   aortic pressure 90/68. Contrast 85 mL. 2 mg of Versed and 50 mcg   of fentanyl were given. TIME: 9:45 to 10:10    Ejection fraction of 65%. No gradient on pullback. Left main arises normally, courses for a short distance. It is a   wide patulous vessel with no disease. It provides an LAD, a   large bifurcating ramus system, and a circumflex system. There   is no disease in the left main. LAD courses to the apex and supplies 2 diagonals. The LAD system   has very minimal irregularity in its mid portion, less than 10%. Ramus artery courses along the lateral wall and supplies 2 large   branches before a terminating branch of the very large ramus. There is no disease noted in the ramus. Circumflex artery courses   in the AV groove and supplies part of a PDA. The circ system is   dominant. Circ system appears normal.    Right coronary artery is a small nondominant artery that is   normal.    CONCLUSIONS: Near normal coronary arteriography with minimal,   less than 10%, plaque in 1 focal area of the mid left anterior   descending; otherwise, there is no disease. The patient has   preserved left ventricular systolic function. Episode in the OR   is almost certainly a profound vagal response during surgery.         MD ROSALBA Isidro / [place]   D:  10/11/2017   10:20   T:  10/11/2017   10:41   Job #: [id number]

## 2017-10-11 NOTE — ANESTHESIA PREPROCEDURE EVALUATION
Anesthetic History   No history of anesthetic complications            Review of Systems / Medical History  Patient summary reviewed and pertinent labs reviewed    Pulmonary        Sleep apnea: CPAP  Smoker (Quit 3 yrs ago)         Neuro/Psych         Psychiatric history     Cardiovascular    Hypertension: well controlled              Exercise tolerance: >4 METS  Comments: Denies CP, SOB or changes in functional status   GI/Hepatic/Renal     GERD: well controlled    Renal disease: stones       Endo/Other        Obesity     Other Findings              Physical Exam    Airway  Mallampati: II  TM Distance: 4 - 6 cm  Neck ROM: normal range of motion   Mouth opening: Normal     Cardiovascular    Rhythm: regular  Rate: normal         Dental    Dentition: Bridges and Caps/crowns     Pulmonary  Breath sounds clear to auscultation               Abdominal  GI exam deferred       Other Findings            Anesthetic Plan    ASA: 2  Anesthesia type: general          Induction: Intravenous  Anesthetic plan and risks discussed with: Patient and Spouse      Plan for LMA

## 2017-10-11 NOTE — PROGRESS NOTES
Bedside and Verbal shift change report given to Prateek Mirza RN (oncoming nurse) by Case Copeland RN (offgoing nurse). Report included the following information SBAR, Kardex, MAR and Recent Results. Right wrist - WDL. No bleeding, dry and intact, no hematoma.

## 2017-10-11 NOTE — PROCEDURES
Cardiac Catheterization Procedure Note    Patient ID:     Name: Amanda Santillan Record Number: [id number]   YOB: 1970    Date of Procedure: 10/11/2017    Physician: Jai Ferrer MD    [facility]    Indications: This is a 56 yrs male who presents with cp. Blood loss less than 5 ml    Sedation. Pt received 2 mg versed and 50 mcg fentanyl for monitored conscious sedation in 1 15 minute intervals. Specimen: None    No complications    No assistants    Time out, Mallampati, and ASA performed    Procedure:  After informed consent, patient was prepped and draped in the usual sterile fashion. The right wrist was infiltrated with lidocaine. The right radial artery was accessed via the modified Seldinger technique with a 6 Sinhala sheath. 70cc Visipaque contrast were utilized for the entire procedure. no closure device used    Catheters.       FINDINGS    Left Ventricle: 65  LVEDP: 15    Left Main:normal    Left Anterior descending coronary artery: normal    Left Circumflex coronary artery: minimal    Right coronary artery: normal      Graft anatomy: na    Intervention if done: na    Conclusions: minimal cad    Recommentations: med tx    No complications      Signed By: Jai Ferrer MD

## 2017-10-11 NOTE — IP AVS SNAPSHOT
[address] 
[phone] Patient: Rosy Tolbert MRN: [id number] You are allergic to the following Allergen Reactions Pcn (Penicillins) Rash Recent Documentation Height Weight BMI Smoking Status 1.803 m 102.3 kg 31.45 kg/m2 Former Smoker Emergency Contacts Name Discharge Info Relation Home Work Mobile Tanya Cotton  Spouse [3] [phone] About your hospitalization You were admitted on:  October 11, 2017 You last received care in the:  [facility] 3 TELEMETRY You were discharged on:  October 13, 2017 Unit phone number:  [phone] Why you were hospitalized Your primary diagnosis was:  Not on File Your diagnoses also included:  Asystole (Hcc), Abnormal Ekg, Anxiety, Hypertension Providers Seen During Your Hospitalizations Provider Role Specialty Primary office phone Linda Hicks MD Attending Provider Emergency Medicine [phone] Mykel Chew MD Attending Provider Cardiology [phone] Your Primary Care Physician (PCP) Primary Care Physician Office Phone Office Fax UNKNOWN, PROVIDER ** None ** ** None ** Follow-up Information Follow up With Details Comments Contact Info Yola Singh,   Oct 13 at [facility] office for monitor fitting  [facility] [address] [facility] [address] 
[phone] Yola Singh,   Friday Nov 3 at [address] [facility] office [facility] [address] [facility] [address] 
[phone] Your Appointments Friday October 13, 2017  2:45 PM EDT Nurse Visit with [address] OFFICE ([address] [place] [address]) [address]  
[address] [place] [address]  
[phone]  Friday October 27, 2017 11:40 AM EDT  
 Office Visit with Elizabeth Clark DO [facility] ([facility]) [address] [facility] [place] [address]  [place]  
[phone] Friday November 03, 2017 10:45 AM EDT Office Visit with Maryetta Epley, DO  
[facility] CARDIOLOGY [place] OFFICE ([address] [facility]) [address] 
[address] [place] [address]  
[phone] Current Discharge Medication List  
  
CONTINUE these medications which have NOT CHANGED Dose & Instructions Dispensing Information Comments Morning Noon Evening Bedtime ADVIL 200 mg tablet Generic drug:  ibuprofen Your next dose is:  As needed Dose:  200 mg Take 200 mg by mouth as needed for Pain. Refills:  0 ALLEGRA 180 mg tablet Generic drug:  fexofenadine Your next dose is:  As needed Take  by mouth daily as needed for Allergies. Refills:  0  
     
   
   
   
  
 citalopram 10 mg tablet Commonly known as:  Greg Inoue Your next dose is:  10-14 Dose:  10 mg Take 10 mg by mouth daily. Indications: anxiety Refills:  0 EXFORGE 5-160 mg per tablet Generic drug:  amLODIPine-valsartan Your next dose is:  10-14 Dose:  1 Tab Take 1 Tab by mouth daily. Indications: hypertension Refills:  0 Discharge Instructions Cardiac Catheterization/Angiography Discharge Instructions *Check the puncture site frequently for swelling or bleeding. If you see any bleeding, lie down and apply pressure over the area with a clean town or washcloth. Notify your doctor for any redness, swelling, drainage or oozing from the puncture site. Notify your doctor for any fever or chills. *If the leg or arm with the puncture becomes cold, numb or painful, call [address] Cardiology at  [phone]. *Activity should be limited for the next 48 hours. Climb stairs as little as possible and avoid any stooping, bending or strenuous activity for 48 hours. No heavy lifting (anything over 10 pounds) for three days. *Do not drive for 48 hours. *You may resume your usual diet. Drink more fluids than usual. 
 
*Have a responsible person drive you home and stay with you for at least 24 hours after your heart catheterization/angiography. *You may remove the bandage from your Right and Arm in 24 hours. You may shower in 24 hours. No tub baths, hot tubs or swimming for one week. Do not place any lotions, creams, powders, ointments over the puncture site for one week. You may place a clean band-aid over the puncture site each day for 5 days. Change this daily. Cardiac Arrhythmia: Care Instructions Your Care Instructions A cardiac arrhythmia is a change in the normal rhythm of the heart. Your heart may beat too fast or too slow or beat with an irregular or skipping rhythm. A change in the heart's rhythm may feel like a really strong heartbeat or a fluttering in your chest. A severe heart rhythm problem can keep the body from getting the blood it needs. This can result in shortness of breath, lightheadedness, and fainting. You may take medicine to treat your condition. Your doctor may recommend a pacemaker or recommend catheter ablation to destroy small parts of the heart that are causing a rhythm problem. Another possible treatment is an implantable cardioverter-defibrillator (ICD). An ICD is a device that gives the heart a shock to return the heart to a normal rhythm. Follow-up care is a key part of your treatment and safety. Be sure to make and go to all appointments, and call your doctor if you are having problems. It's also a good idea to know your test results and keep a list of the medicines you take. How can you care for yourself at home? General care · Be safe with medicines. Take your medicines exactly as prescribed. Call your doctor if you think you are having a problem with your medicine. You will get more details on the specific medicines your doctor prescribes. · If you received a pacemaker or an ICD, you will get a fact sheet about it. · Wear medical alert jewelry that says you have an abnormal heart rhythm. You can buy this at most drugstores. Lifestyle changes · Eat a heart-healthy diet. · Stay at a healthy weight. Lose weight if you need to. · Avoid nicotine, too much alcohol, and illegal drugs (meth, speed, and cocaine). Also, get enough sleep and do not overeat. · Ask your doctor whether you can take over-the-counter medicines (such as decongestants). These can make your heart beat fast. 
· Talk to your doctor about any limits to activities, such as driving, or tasks where you use power tools or ladders. Activity · Start light exercise if your doctor says you can. Even a small amount will help you get stronger, have more energy, and manage your stress. · If your doctor recommends it, get more exercise. Walking is a good choice. Bit by bit, increase the amount you walk every day. Try for at least 30 minutes on most days of the week. You also may want to swim, bike, or do other activities. · When you exercise, watch for signs that your heart is working too hard. You are pushing too hard if you cannot talk while you exercise. If you become short of breath or dizzy or have chest pain, sit down and rest. 
· Check your pulse daily. Place two fingers on the artery at the palm side of your wrist, in line with your thumb. If your heartbeat seems uneven, talk to your doctor. When should you call for help? Call 911 anytime you think you may need emergency care. For example, call if: 
· You passed out (lost consciousness). · You have symptoms of a heart attack. These may include: ¨ Chest pain or pressure, or a strange feeling in the chest. 
 ¨ Sweating. ¨ Shortness of breath. ¨ Nausea or vomiting. ¨ Pain, pressure, or a strange feeling in the back, neck, jaw, or upper belly or in one or both shoulders or arms. ¨ Lightheadedness or sudden weakness. ¨ A fast or irregular heartbeat. After you call 911, the  may tell you to chew 1 adult-strength or 2 to 4 low-dose aspirin. Wait for an ambulance. Do not try to drive yourself. · You have signs of a stroke. These include: 
¨ Sudden numbness, paralysis, or weakness in your face, arm, or leg, especially on only one side of your body. ¨ New problems with walking or balance. ¨ Sudden vision changes. ¨ Drooling or slurred speech. ¨ New problems speaking or understanding simple statements, or feeling confused. ¨ A sudden, severe headache that is different from past headaches. Call your doctor now or seek immediate medical care if: 
· You are dizzy or lightheaded, or you feel like you may faint. · You have new or increased shortness of breath. · You had surgery and you have signs of infection, such as: 
¨ Increased pain, swelling, warmth, or redness. ¨ Red streaks leading from the cut (incision). ¨ Pus draining from the incision. ¨ A fever. Watch closely for changes in your health, and be sure to contact your doctor if: 
· You do not get better as expected. Where can you learn more? Go to http://rodrigue-alona.info/. Enter X471 in the search box to learn more about \"Cardiac Arrhythmia: Care Instructions. \" Current as of: May 5, 2016 Content Version: 11.3 © 0539-9452 [employer]. Care instructions adapted under license by [employer] (which disclaims liability or warranty for this information). If you have questions about a medical condition or this instruction, always ask your healthcare professional. [place] [address] any warranty or liability for your use of this information. Discharge Orders None WorkMeIn Announcement We are excited to announce that we are making your provider's discharge notes available to you in WorkMeIn. You will see these notes when they are completed and signed by the physician that discharged you from your recent hospital stay. If you have any questions or concerns about any information you see in WorkMeIn, please call the Health Information Department where you were seen or reach out to your Primary Care Provider for more information about your plan of care. Introducing [address]! [facility] introduces WorkMeIn patient portal. Now you can access parts of your medical record, email your doctor's office, and request medication refills online. 1. In your internet browser, go to [email or web address] 2. Click on the First Time User? Click Here link in the Sign In box. You will see the New Member Sign Up page. 3. Enter your WorkMeIn Access Code exactly as it appears below. You will not need to use this code after youve completed the sign-up process. If you do not sign up before the expiration date, you must request a new code. · WorkMeIn Access Code: [facility] Expires: 1/9/2018  9:28 AM 
 
4. Enter the last four digits of your Social Security Number (xxxx) and Date of Birth (mm/dd/yyyy) as indicated and click Submit. You will be taken to the next sign-up page. 5. Create a WorkMeIn ID. This will be your WorkMeIn login ID and cannot be changed, so think of one that is secure and easy to remember. 6. Create a WorkMeIn password. You can change your password at any time. 7. Enter your Password Reset Question and Answer. This can be used at a later time if you forget your password. 8. Enter your e-mail address. You will receive e-mail notification when new information is available in [address] 9. Click Sign Up. You can now view and download portions of your medical record.  
10. Click the Download Summary menu link to download a portable copy of your medical information. If you have questions, please visit the Frequently Asked Questions section of the [employer] website. Remember, MyChart is NOT to be used for urgent needs. For medical emergencies, dial 911. Now available from your iPhone and Android! General Information Please provide this summary of care documentation to your next provider. Patient Signature:  ____________________________________________________________ Date:  ____________________________________________________________  
  
Irish  Provider Signature:  ____________________________________________________________ Date:  ____________________________________________________________

## 2017-10-11 NOTE — ED TRIAGE NOTES
Dr. Cleotilde Mcburney at bedside. Patient was getting knee scope and went into asystole. Gave amp of epi and then atropine. CPR initiated by staff. Patient given esmolol by OR staff. Patient awake alert nsr with t wave inversion.

## 2017-10-11 NOTE — PROGRESS NOTES
TRANSFER - IN REPORT:    Verbal report received from Maykel Frankel RN on Jose Medrano being received from Cath lab post op for routine progression of care. Report consisted of patients Situation, Background, Assessment and Recommendations(SBAR). Information from the following report(s) SBAR was reviewed. Opportunity for questions and clarification was provided. Assessment completed upon patients arrival to unit and care assumed. Patient received to room 311. Patient connected to monitor and assessment completed. Plan of care reviewed. Patient oriented to room and call light. Patient aware to use call light to communicate any chest pain or needs. Admission skin assessment completed with second RN and reveals the following: Right radial site with no b/h/e noted. Heels firm and not boggy. No sacrum breakdown noted. Chest is sore with some skin bruise noted to mid and left chest with reddness from zoll pad placement from ACLS this morning. Left knee wrapped in ACE wrap with puncture site post procedure this morning.

## 2017-10-12 LAB
ANION GAP SERPL CALC-SCNC: 9 MMOL/L (ref 7–16)
BUN SERPL-MCNC: 13 MG/DL (ref 6–23)
CALCIUM SERPL-MCNC: 8.6 MG/DL (ref 8.3–10.4)
CHLORIDE SERPL-SCNC: 110 MMOL/L (ref 98–107)
CHOLEST SERPL-MCNC: 124 MG/DL
CO2 SERPL-SCNC: 23 MMOL/L (ref 21–32)
CREAT SERPL-MCNC: 0.68 MG/DL (ref 0.8–1.5)
ERYTHROCYTE [DISTWIDTH] IN BLOOD BY AUTOMATED COUNT: 12.8 % (ref 11.9–14.6)
GLUCOSE SERPL-MCNC: 82 MG/DL (ref 65–100)
HCT VFR BLD AUTO: 41.5 % (ref 41.1–50.3)
HDLC SERPL-MCNC: 31 MG/DL (ref 40–60)
HDLC SERPL: 4 {RATIO}
HGB BLD-MCNC: 14.2 G/DL (ref 13.6–17.2)
LDLC SERPL CALC-MCNC: 58.8 MG/DL
LIPID PROFILE,FLP: ABNORMAL
MCH RBC QN AUTO: 32.9 PG (ref 26.1–32.9)
MCHC RBC AUTO-ENTMCNC: 34.2 G/DL (ref 31.4–35)
MCV RBC AUTO: 96.3 FL (ref 79.6–97.8)
PLATELET # BLD AUTO: 171 K/UL (ref 150–450)
PMV BLD AUTO: 10.5 FL (ref 10.8–14.1)
POTASSIUM SERPL-SCNC: 3.7 MMOL/L (ref 3.5–5.1)
RBC # BLD AUTO: 4.31 M/UL (ref 4.23–5.67)
SODIUM SERPL-SCNC: 142 MMOL/L (ref 136–145)
TRIGL SERPL-MCNC: 171 MG/DL (ref 35–150)
VLDLC SERPL CALC-MCNC: 34.2 MG/DL (ref 6–23)
WBC # BLD AUTO: 9.3 K/UL (ref 4.3–11.1)

## 2017-10-12 PROCEDURE — 74011250637 HC RX REV CODE- 250/637: Performed by: PHYSICIAN ASSISTANT

## 2017-10-12 PROCEDURE — 36415 COLL VENOUS BLD VENIPUNCTURE: CPT | Performed by: PHYSICIAN ASSISTANT

## 2017-10-12 PROCEDURE — 85027 COMPLETE CBC AUTOMATED: CPT | Performed by: PHYSICIAN ASSISTANT

## 2017-10-12 PROCEDURE — 80061 LIPID PANEL: CPT | Performed by: PHYSICIAN ASSISTANT

## 2017-10-12 PROCEDURE — 74011250637 HC RX REV CODE- 250/637: Performed by: NURSE PRACTITIONER

## 2017-10-12 PROCEDURE — 80048 BASIC METABOLIC PNL TOTAL CA: CPT | Performed by: PHYSICIAN ASSISTANT

## 2017-10-12 PROCEDURE — 65660000000 HC RM CCU STEPDOWN

## 2017-10-12 RX ORDER — LORAZEPAM 1 MG/1
1 TABLET ORAL
Status: COMPLETED | OUTPATIENT
Start: 2017-10-12 | End: 2017-10-12

## 2017-10-12 RX ADMIN — IBUPROFEN 800 MG: 400 TABLET, FILM COATED ORAL at 22:03

## 2017-10-12 RX ADMIN — IBUPROFEN 800 MG: 400 TABLET, FILM COATED ORAL at 08:50

## 2017-10-12 RX ADMIN — AMLODIPINE BESYLATE 5 MG: 5 TABLET ORAL at 08:40

## 2017-10-12 RX ADMIN — LORAZEPAM 1 MG: 1 TABLET ORAL at 21:59

## 2017-10-12 RX ADMIN — ACETAMINOPHEN 650 MG: 325 TABLET ORAL at 16:49

## 2017-10-12 RX ADMIN — ASPIRIN 81 MG CHEWABLE TABLET 81 MG: 81 TABLET CHEWABLE at 09:00

## 2017-10-12 RX ADMIN — VALSARTAN 160 MG: 160 TABLET, FILM COATED ORAL at 08:40

## 2017-10-12 RX ADMIN — CITALOPRAM HYDROBROMIDE 10 MG: 10 TABLET ORAL at 08:40

## 2017-10-12 NOTE — PROGRESS NOTES
Spiritual Care visit. Initial visit.  met this gentleman and his wife in [facility]. [place] that he had come to the [facility] for outpatient surgery, and that cardiac issues had emerged while being prepared for surgery. He was awakened, and brought to the hospital for diagnosis and possible treatment. Orthopaedic surgery is on hold.     Visit by Kelli Romero M.Ed., Th.B. ,Staff

## 2017-10-12 NOTE — PROGRESS NOTES
Bedside and Verbal shift change report given to Case Copeland RN (oncoming nurse) by Prateek Mirza RN (offgoing nurse). Report included the following information SBAR, Kardex, MAR and Recent Results.

## 2017-10-12 NOTE — PROGRESS NOTES
[facility] PROGRESS NOTE           10/12/2017 7:30 AM    Admit Date: 10/11/2017      Subjective:   Pt without CP or SOB. Feels fine. ROS:  Cardiovascular:  As noted above    Objective:      Vitals:    10/11/17 1821 10/11/17 2102 10/12/17 0110 10/12/17 0452   BP: 127/70 126/75 123/71 140/79   Pulse: 63 64 63 68   Resp: 20 18 18 18   Temp: 97.5 °F (36.4 °C) 97.6 °F (36.4 °C) 98.3 °F (36.8 °C) 97.7 °F (36.5 °C)   SpO2: 96% 94% 93% 93%   Weight:    103.8 kg (228 lb 14.4 oz)   Height:           Physical Exam:  General-No Acute Distress  Neck- supple, no JVD  CV- regular rate and rhythm no MRG  Lung- clear bilaterally  Abd- soft, nontender, nondistended  Ext- no edema bilaterally. Skin- warm and dry    Data Review:   Recent Labs      10/12/17   0541  10/11/17   0808   NA  142  144   K  3.7  3.7   MG   --   2.3   BUN  13  15   CREA  0.68*  1.01   GLU  82  120*   WBC  9.3  8.2   HGB  14.2  14.9   HCT  41.5  43.0   PLT  171  178   TROIQ   --   0.03   CHOL  124   --    LDLC  58.8   --    HDL  31*   --      Echo:  -  Left ventricle: Systolic function was at the lower limits of normal.  Ejection fraction was estimated to be 50 %. There were no regional wall   motion  abnormalities. Assessment/Plan:   Asystole ([address] (10/11/2017)- s/p ACLS w ROSC, cath with minimal CAD, echo, no arrhythmias on tele. Abnormal EKG (10/11/2017)- Cath with minimal CAD. Anxiety (10/11/2017)    Hypertension (10/11/2017)- Cont norvasc, his diovan has been on hold.      LEEANNA Gallo  10/12/2017 7:30 AM

## 2017-10-12 NOTE — PROGRESS NOTES
Problem: Falls - Risk of  Goal: *Absence of Falls  Document Chester Fall Risk and appropriate interventions in the flowsheet.    Outcome: Progressing Towards Goal  Fall Risk Interventions:              Medication Interventions: Patient to call before getting OOB

## 2017-10-12 NOTE — PROGRESS NOTES
Bedside and Verbal shift change report given to Neto Mendoza RN (oncoming nurse) by Emy Coyle RN (offgoing nurse). Report included the following information SBAR, Kardex, MAR and Recent Results.

## 2017-10-13 VITALS
HEIGHT: 71 IN | RESPIRATION RATE: 20 BRPM | WEIGHT: 225.5 LBS | DIASTOLIC BLOOD PRESSURE: 68 MMHG | TEMPERATURE: 97 F | HEART RATE: 73 BPM | BODY MASS INDEX: 31.57 KG/M2 | SYSTOLIC BLOOD PRESSURE: 152 MMHG | OXYGEN SATURATION: 96 %

## 2017-10-13 LAB
ALBUMIN SERPL-MCNC: 3.6 G/DL (ref 3.5–5)
ALBUMIN/GLOB SERPL: 1.1 {RATIO} (ref 1.2–3.5)
ALP SERPL-CCNC: 82 U/L (ref 50–136)
ALT SERPL-CCNC: 53 U/L (ref 12–65)
ANION GAP SERPL CALC-SCNC: 8 MMOL/L (ref 7–16)
AST SERPL-CCNC: 33 U/L (ref 15–37)
BILIRUB SERPL-MCNC: 0.8 MG/DL (ref 0.2–1.1)
BUN SERPL-MCNC: 10 MG/DL (ref 6–23)
CALCIUM SERPL-MCNC: 8.9 MG/DL (ref 8.3–10.4)
CHLORIDE SERPL-SCNC: 109 MMOL/L (ref 98–107)
CO2 SERPL-SCNC: 25 MMOL/L (ref 21–32)
CREAT SERPL-MCNC: 0.7 MG/DL (ref 0.8–1.5)
ERYTHROCYTE [DISTWIDTH] IN BLOOD BY AUTOMATED COUNT: 12.6 % (ref 11.9–14.6)
GLOBULIN SER CALC-MCNC: 3.4 G/DL (ref 2.3–3.5)
GLUCOSE SERPL-MCNC: 86 MG/DL (ref 65–100)
HCT VFR BLD AUTO: 42.2 % (ref 41.1–50.3)
HGB BLD-MCNC: 14.9 G/DL (ref 13.6–17.2)
MAGNESIUM SERPL-MCNC: 2.1 MG/DL (ref 1.8–2.4)
MCH RBC QN AUTO: 33.7 PG (ref 26.1–32.9)
MCHC RBC AUTO-ENTMCNC: 35.3 G/DL (ref 31.4–35)
MCV RBC AUTO: 95.5 FL (ref 79.6–97.8)
PLATELET # BLD AUTO: 164 K/UL (ref 150–450)
PMV BLD AUTO: 10.7 FL (ref 10.8–14.1)
POTASSIUM SERPL-SCNC: 3.7 MMOL/L (ref 3.5–5.1)
PROT SERPL-MCNC: 7 G/DL (ref 6.3–8.2)
RBC # BLD AUTO: 4.42 M/UL (ref 4.23–5.67)
SODIUM SERPL-SCNC: 142 MMOL/L (ref 136–145)
TSH SERPL DL<=0.005 MIU/L-ACNC: 0.79 UIU/ML (ref 0.36–3.74)
WBC # BLD AUTO: 7.3 K/UL (ref 4.3–11.1)

## 2017-10-13 PROCEDURE — 36415 COLL VENOUS BLD VENIPUNCTURE: CPT | Performed by: PHYSICIAN ASSISTANT

## 2017-10-13 PROCEDURE — 85027 COMPLETE CBC AUTOMATED: CPT | Performed by: PHYSICIAN ASSISTANT

## 2017-10-13 PROCEDURE — 74011250637 HC RX REV CODE- 250/637: Performed by: PHYSICIAN ASSISTANT

## 2017-10-13 PROCEDURE — 80053 COMPREHEN METABOLIC PANEL: CPT | Performed by: INTERNAL MEDICINE

## 2017-10-13 PROCEDURE — 83735 ASSAY OF MAGNESIUM: CPT | Performed by: PHYSICIAN ASSISTANT

## 2017-10-13 PROCEDURE — 84443 ASSAY THYROID STIM HORMONE: CPT | Performed by: INTERNAL MEDICINE

## 2017-10-13 RX ADMIN — ASPIRIN 81 MG CHEWABLE TABLET 81 MG: 81 TABLET CHEWABLE at 08:58

## 2017-10-13 RX ADMIN — AMLODIPINE BESYLATE 5 MG: 5 TABLET ORAL at 08:58

## 2017-10-13 RX ADMIN — CITALOPRAM HYDROBROMIDE 10 MG: 10 TABLET ORAL at 08:58

## 2017-10-13 RX ADMIN — VALSARTAN 160 MG: 160 TABLET, FILM COATED ORAL at 08:58

## 2017-10-13 RX ADMIN — IBUPROFEN 800 MG: 400 TABLET, FILM COATED ORAL at 08:58

## 2017-10-13 NOTE — PROGRESS NOTES
Discharge instructions reviewed with patient. Prescriptions given for all surgery prescriptions provided, colace, oxycodone, aspirin and zofran and med info sheets provided for all new medications. Opportunity for questions provided. Patient voiced understanding of all discharge instructions. IVs removed and monitor off at discharge.

## 2017-10-13 NOTE — PROGRESS NOTES
Bedside and Verbal shift change report given to Usha Morris RN (oncoming nurse) by Kenya Newsome RN (offgoing nurse). Report included the following information SBAR, Kardex, Intake/Output, MAR and Recent Results.

## 2017-10-13 NOTE — PROGRESS NOTES
[facility] CARDIOLOGY PROGRESS NOTE           10/13/2017 7:50 AM    Admit Date: 10/11/2017      Subjective:   Walking halls, showered, feeling well and wants to be home. No CP, dizziness, weakness. No events on tele. ROS:  Cardiovascular:  As noted above    Objective:      Vitals:    10/12/17 2117 10/12/17 2132 10/13/17 0044 10/13/17 0446   BP: (!) 154/97 150/59 145/80 133/78   Pulse: (!) 58  (!) 57 60   Resp: 14  14 15   Temp: 97.2 °F (36.2 °C)  97.7 °F (36.5 °C) 98.5 °F (36.9 °C)   SpO2: 98%  95% 95%   Weight:    225 lb 8 oz (102.3 kg)   Height:           Physical Exam:  General-No Acute Distress  Neck- supple, no JVD  CV- regular rate and rhythm no MRG  Lung- clear bilaterally  Abd- soft, nontender, nondistended  Ext- no edema bilaterally. Skin- warm and dry    Data Review:   Recent Labs      10/13/17   0555  10/12/17   0541  10/11/17   0808   NA  142  142  144   K  3.7  3.7  3.7   MG   --    --   2.3   BUN  10  13  15   CREA  0.70*  0.68*  1.01   GLU  86  82  120*   WBC  7.3  9.3  8.2   HGB  14.9  14.2  14.9   HCT  42.2  41.5  43.0   PLT  164  171  178   TROIQ   --    --   0.03   CHOL   --   124   --    LDLC   --   58.8   --    HDL   --   31*   --        Assessment/Plan:     Active Problems:    Add back home BP med. Echo and LHC ok. No events on tele. Unclear cause or exact event in OR, anesthesia related? Unclear. No EPS needed. No ICD needed. Home with 14 day home monitor and then see us in the office after the monitor. NO more surgery for now. He will call for ANY dizziness, weakness spells, angina or other issues once home. He verbalizes understanding about plan from here. Will review with Dr. Fantasma Whiting at later date about re-attempt at knee surgery.           Landon Marmolejo DO  10/13/2017 7:50 AM

## 2017-10-13 NOTE — DISCHARGE SUMMARY
[facility] Cardiology Discharge Summary     Patient ID:  Elsie Rendon  [id number]  79 y.o.  1970    Admit date: 10/11/2017    Discharge date:  10/13/2017    Admitting Physician: Maribel Pollard MD     Discharge Physician: LEEANNA Gallo/Dr. Althea Armas    Admission Diagnoses: Unstable angina [facility])    Discharge Diagnoses:    Diagnosis    Asystole ([address]    Abnormal EKG    Anxiety    Hypertension       Cardiology Procedures this admission:  Diagnostic left heart catheterization, echo   Consults: None    Hospital Course: Patient was scheduled for elective outpatient knee arthroscopy but intraoperatively he was noted to be bradycardic and then went asystole, ortho case was aborted, CPR started w ROSC. EKG showed septal/anterior STEMI. Cardiac enzymes were negative. LHC was planned emergently for 10-11-17. Patient underwent cardiac catheterization by Dr. Ernst Newton which showed normal coronary arteriography w less than 10% plaque. Patient tolerated the procedure well and admitted to the telemetry floor for recovery. He had no evidence of arrhythmia. It was felt episode in OR was most likely vagal response. An echocardiogram was performed with report as follows:     -  Left ventricle: Systolic function was at the lower limits of normal.  Ejection fraction was estimated to be 50 %. There were no regional wall   motion  abnormalities. The morning of 10/13/2017 patient was up feeling well without any complaints of chest pain or shortness of breath. Patient's right radial cath site was clean, dry and intact without hematoma or bruit. Patient's labs were WNL. Patient was seen and examined by Dr. Althea Armas and determined stable and ready for discharge. Will arrange for pt to wear a 14 day ecardio monitor to r/o any arrhythmias. F/U Oct 13 at 2:45[facility] [place] office for monitor fitting and follow up with [facility] Cardiology Dr. Althea Armas Friday Nov 3 at [address] [place] [address] office.   He is to f/u with orthopaedics to determine further management of knee problems. DISPOSITION: The patient is being discharged home in stable condition on a low saturated fat, low cholesterol and low salt diet. The patient is instructed to advance activities as tolerated to the limit of fatigue or shortness of breath. The patient is instructed to avoid all heavy lifting for 5 days. The patient is instructed to watch the cath site for bleeding/oozing; if seen, the patient is instructed to apply firm pressure with a clean cloth and call [facility]iology at [phone]. The patient is instructed to watch for signs of infection which include: increasing area of redness, fever/hot to touch or purulent drainage at the catheterization site. The patient is instructed not to soak in a bathtub for 7-10 days, but is cleared to shower. The patient is instructed to call the office or return to the ER for immediate evaluation for any shortness of breath or chest pain not relieved by NTG. Discharge Exam:   Visit Vitals    /78 (BP 1 Location: Right arm, BP Patient Position: Supine)    Pulse 60    Temp 98.5 °F (36.9 °C)    Resp 15    Ht 5' 11\" (1.803 m)    Wt 102.3 kg (225 lb 8 oz)    SpO2 95%    BMI 31.45 kg/m2     Patient has been seen by Dr. Ashley Jacobs: see his progress note for exam details.     Recent Results (from the past 24 hour(s))   CBC W/O DIFF    Collection Time: 10/13/17  5:55 AM   Result Value Ref Range    WBC 7.3 4.3 - 11.1 K/uL    RBC 4.42 4.23 - 5.67 M/uL    HGB 14.9 13.6 - 17.2 g/dL    HCT 42.2 41.1 - 50.3 %    MCV 95.5 79.6 - 97.8 FL    MCH 33.7 (H) 26.1 - 32.9 PG    MCHC 35.3 (H) 31.4 - 35.0 g/dL    RDW 12.6 11.9 - 14.6 %    PLATELET 407 601 - 220 K/uL    MPV 10.7 (L) 10.8 - 71.1 FL   METABOLIC PANEL, COMPREHENSIVE    Collection Time: 10/13/17  5:55 AM   Result Value Ref Range    Sodium 142 136 - 145 mmol/L    Potassium 3.7 3.5 - 5.1 mmol/L    Chloride 109 (H) 98 - 107 mmol/L    CO2 25 21 - 32 mmol/L    Anion gap 8 7 - 16 mmol/L    Glucose 86 65 - 100 mg/dL    BUN 10 6 - 23 MG/DL    Creatinine 0.70 (L) 0.8 - 1.5 MG/DL    GFR est AA >60 >60 ml/min/1.73m2    GFR est non-AA >60 >60 ml/min/1.73m2    Calcium 8.9 8.3 - 10.4 MG/DL    Bilirubin, total 0.8 0.2 - 1.1 MG/DL    ALT (SGPT) 53 12 - 65 U/L    AST (SGOT) 33 15 - 37 U/L    Alk. phosphatase 82 50 - 136 U/L    Protein, total 7.0 6.3 - 8.2 g/dL    Albumin 3.6 3.5 - 5.0 g/dL    Globulin 3.4 2.3 - 3.5 g/dL    A-G Ratio 1.1 (L) 1.2 - 3.5     TSH 3RD GENERATION    Collection Time: 10/13/17  5:55 AM   Result Value Ref Range    TSH 0.792 0.358 - 3.740 uIU/mL         Patient Instructions:   Current Discharge Medication List      CONTINUE these medications which have NOT CHANGED    Details   ibuprofen (ADVIL) 200 mg tablet Take 200 mg by mouth as needed for Pain. fexofenadine (ALLEGRA) 180 mg tablet Take  by mouth daily as needed for Allergies. citalopram (CELEXA) 10 mg tablet Take 10 mg by mouth daily. Indications: anxiety      amLODIPine-valsartan (EXFORGE) 5-160 mg per tablet Take 1 Tab by mouth daily.  Indications: hypertension               Signed:  Ismael Mcdowell PA-C  10/13/2017  7:18 AM

## 2017-10-13 NOTE — DISCHARGE INSTRUCTIONS
Cardiac Catheterization/Angiography Discharge Instructions    *Check the puncture site frequently for swelling or bleeding. If you see any bleeding, lie down and apply pressure over the area with a clean town or washcloth. Notify your doctor for any redness, swelling, drainage or oozing from the puncture site. Notify your doctor for any fever or chills. *If the leg or arm with the puncture becomes cold, numb or painful, call [facility] at  [phone]. *Activity should be limited for the next 48 hours. Climb stairs as little as possible and avoid any stooping, bending or strenuous activity for 48 hours. No heavy lifting (anything over 10 pounds) for three days. *Do not drive for 48 hours. *You may resume your usual diet. Drink more fluids than usual.    *Have a responsible person drive you home and stay with you for at least 24 hours after your heart catheterization/angiography. *You may remove the bandage from your Right and Arm in 24 hours. You may shower in 24 hours. No tub baths, hot tubs or swimming for one week. Do not place any lotions, creams, powders, ointments over the puncture site for one week. You may place a clean band-aid over the puncture site each day for 5 days. Change this daily. Cardiac Arrhythmia: Care Instructions  Your Care Instructions    A cardiac arrhythmia is a change in the normal rhythm of the heart. Your heart may beat too fast or too slow or beat with an irregular or skipping rhythm. A change in the heart's rhythm may feel like a really strong heartbeat or a fluttering in your chest. A severe heart rhythm problem can keep the body from getting the blood it needs. This can result in shortness of breath, lightheadedness, and fainting. You may take medicine to treat your condition. Your doctor may recommend a pacemaker or recommend catheter ablation to destroy small parts of the heart that are causing a rhythm problem.  Another possible treatment is an implantable cardioverter-defibrillator (ICD). An ICD is a device that gives the heart a shock to return the heart to a normal rhythm. Follow-up care is a key part of your treatment and safety. Be sure to make and go to all appointments, and call your doctor if you are having problems. It's also a good idea to know your test results and keep a list of the medicines you take. How can you care for yourself at home? General care  · Be safe with medicines. Take your medicines exactly as prescribed. Call your doctor if you think you are having a problem with your medicine. You will get more details on the specific medicines your doctor prescribes. · If you received a pacemaker or an ICD, you will get a fact sheet about it. · Wear medical alert jewelry that says you have an abnormal heart rhythm. You can buy this at most drugstores. Lifestyle changes  · Eat a heart-healthy diet. · Stay at a healthy weight. Lose weight if you need to. · Avoid nicotine, too much alcohol, and illegal drugs (meth, speed, and cocaine). Also, get enough sleep and do not overeat. · Ask your doctor whether you can take over-the-counter medicines (such as decongestants). These can make your heart beat fast.  · Talk to your doctor about any limits to activities, such as driving, or tasks where you use power tools or ladders. Activity  · Start light exercise if your doctor says you can. Even a small amount will help you get stronger, have more energy, and manage your stress. · If your doctor recommends it, get more exercise. Walking is a good choice. Bit by bit, increase the amount you walk every day. Try for at least 30 minutes on most days of the week. You also may want to swim, bike, or do other activities. · When you exercise, watch for signs that your heart is working too hard. You are pushing too hard if you cannot talk while you exercise.  If you become short of breath or dizzy or have chest pain, sit down and rest.  · Check your pulse daily. Place two fingers on the artery at the palm side of your wrist, in line with your thumb. If your heartbeat seems uneven, talk to your doctor. When should you call for help? Call 911 anytime you think you may need emergency care. For example, call if:  · You passed out (lost consciousness). · You have symptoms of a heart attack. These may include:  ¨ Chest pain or pressure, or a strange feeling in the chest.  ¨ Sweating. ¨ Shortness of breath. ¨ Nausea or vomiting. ¨ Pain, pressure, or a strange feeling in the back, neck, jaw, or upper belly or in one or both shoulders or arms. ¨ Lightheadedness or sudden weakness. ¨ A fast or irregular heartbeat. After you call 911, the  may tell you to chew 1 adult-strength or 2 to 4 low-dose aspirin. Wait for an ambulance. Do not try to drive yourself. · You have signs of a stroke. These include:  ¨ Sudden numbness, paralysis, or weakness in your face, arm, or leg, especially on only one side of your body. ¨ New problems with walking or balance. ¨ Sudden vision changes. ¨ Drooling or slurred speech. ¨ New problems speaking or understanding simple statements, or feeling confused. ¨ A sudden, severe headache that is different from past headaches. Call your doctor now or seek immediate medical care if:  · You are dizzy or lightheaded, or you feel like you may faint. · You have new or increased shortness of breath. · You had surgery and you have signs of infection, such as:  ¨ Increased pain, swelling, warmth, or redness. ¨ Red streaks leading from the cut (incision). ¨ Pus draining from the incision. ¨ A fever. Watch closely for changes in your health, and be sure to contact your doctor if:  · You do not get better as expected. Where can you learn more? Go to http://rodrigue-alona.info/. Enter F253 in the search box to learn more about \"Cardiac Arrhythmia: Care Instructions. \"  Current as of:  May 5, 2016  Content Version: 11.3  © 1650-2370 [employer]. Care instructions adapted under license by [employer] (which disclaims liability or warranty for this information). If you have questions about a medical condition or this instruction, always ask your healthcare professional. [address] any warranty or liability for your use of this information.

## 2017-11-08 PROBLEM — R00.1 BRADYCARDIA: Status: ACTIVE | Noted: 2017-11-08

## 2017-11-08 PROBLEM — G47.33 OBSTRUCTIVE SLEEP APNEA SYNDROME: Status: ACTIVE | Noted: 2017-11-08

## 2017-11-08 PROBLEM — I46.9 ASYSTOLE (HCC): Status: RESOLVED | Noted: 2017-10-11 | Resolved: 2017-11-08

## 2018-02-06 ENCOUNTER — ANESTHESIA EVENT (OUTPATIENT)
Dept: SURGERY | Age: 48
End: 2018-02-06
Payer: OTHER MISCELLANEOUS

## 2018-02-07 ENCOUNTER — HOSPITAL ENCOUNTER (OUTPATIENT)
Age: 48
Setting detail: OUTPATIENT SURGERY
Discharge: HOME | End: 2018-02-07
Attending: ORTHOPAEDIC SURGERY | Admitting: ORTHOPAEDIC SURGERY
Payer: OTHER MISCELLANEOUS

## 2018-02-07 ENCOUNTER — ANESTHESIA (OUTPATIENT)
Dept: SURGERY | Age: 48
End: 2018-02-07
Payer: OTHER MISCELLANEOUS

## 2018-02-07 VITALS
TEMPERATURE: 97.7 F | DIASTOLIC BLOOD PRESSURE: 95 MMHG | SYSTOLIC BLOOD PRESSURE: 145 MMHG | BODY MASS INDEX: 33.47 KG/M2 | HEART RATE: 67 BPM | OXYGEN SATURATION: 93 % | WEIGHT: 240 LBS | RESPIRATION RATE: 16 BRPM

## 2018-02-07 PROCEDURE — 74011250636 HC RX REV CODE- 250/636

## 2018-02-07 PROCEDURE — 77030019605: Performed by: ORTHOPAEDIC SURGERY

## 2018-02-07 PROCEDURE — 77030002933 HC SUT MCRYL J&J -A: Performed by: ORTHOPAEDIC SURGERY

## 2018-02-07 PROCEDURE — 74011250636 HC RX REV CODE- 250/636: Performed by: ANESTHESIOLOGY

## 2018-02-07 PROCEDURE — 77030000032 HC CUF TRNQT ZIMM -B: Performed by: ORTHOPAEDIC SURGERY

## 2018-02-07 PROCEDURE — 76210000016 HC OR PH I REC 1 TO 1.5 HR: Performed by: ORTHOPAEDIC SURGERY

## 2018-02-07 PROCEDURE — 76060000032 HC ANESTHESIA 0.5 TO 1 HR: Performed by: ORTHOPAEDIC SURGERY

## 2018-02-07 PROCEDURE — 77030003666 HC NDL SPINAL BD -A: Performed by: ORTHOPAEDIC SURGERY

## 2018-02-07 PROCEDURE — 74011000250 HC RX REV CODE- 250

## 2018-02-07 PROCEDURE — 74011250636 HC RX REV CODE- 250/636: Performed by: ORTHOPAEDIC SURGERY

## 2018-02-07 PROCEDURE — 76010000138 HC OR TIME 0.5 TO 1 HR: Performed by: ORTHOPAEDIC SURGERY

## 2018-02-07 PROCEDURE — 76210000020 HC REC RM PH II FIRST 0.5 HR: Performed by: ORTHOPAEDIC SURGERY

## 2018-02-07 PROCEDURE — 77030006668 HC BLD SHV MENSCS STRY -B: Performed by: ORTHOPAEDIC SURGERY

## 2018-02-07 PROCEDURE — 77030003665 HC NDL SPN BBMI -A: Performed by: ANESTHESIOLOGY

## 2018-02-07 PROCEDURE — 74011250637 HC RX REV CODE- 250/637: Performed by: ANESTHESIOLOGY

## 2018-02-07 PROCEDURE — 77030007880 HC KT SPN EPDRL BBMI -B: Performed by: ANESTHESIOLOGY

## 2018-02-07 RX ORDER — PROPOFOL 10 MG/ML
INJECTION, EMULSION INTRAVENOUS
Status: DISCONTINUED | OUTPATIENT
Start: 2018-02-07 | End: 2018-02-07 | Stop reason: HOSPADM

## 2018-02-07 RX ORDER — CEFAZOLIN SODIUM/WATER 2 G/20 ML
SYRINGE (ML) INTRAVENOUS
Status: DISCONTINUED
Start: 2018-02-07 | End: 2018-02-07 | Stop reason: HOSPADM

## 2018-02-07 RX ORDER — FENTANYL CITRATE 50 UG/ML
100 INJECTION, SOLUTION INTRAMUSCULAR; INTRAVENOUS ONCE
Status: DISCONTINUED | OUTPATIENT
Start: 2018-02-07 | End: 2018-02-07 | Stop reason: HOSPADM

## 2018-02-07 RX ORDER — PSEUDOEPHEDRINE HCL 30 MG
30 TABLET ORAL
COMMUNITY

## 2018-02-07 RX ORDER — MIDAZOLAM HYDROCHLORIDE 1 MG/ML
2 INJECTION, SOLUTION INTRAMUSCULAR; INTRAVENOUS ONCE
Status: DISCONTINUED | OUTPATIENT
Start: 2018-02-07 | End: 2018-02-07 | Stop reason: HOSPADM

## 2018-02-07 RX ORDER — PROPOFOL 10 MG/ML
INJECTION, EMULSION INTRAVENOUS AS NEEDED
Status: DISCONTINUED | OUTPATIENT
Start: 2018-02-07 | End: 2018-02-07 | Stop reason: HOSPADM

## 2018-02-07 RX ORDER — ALBUTEROL SULFATE 0.83 MG/ML
2.5 SOLUTION RESPIRATORY (INHALATION) AS NEEDED
Status: DISCONTINUED | OUTPATIENT
Start: 2018-02-07 | End: 2018-02-07 | Stop reason: HOSPADM

## 2018-02-07 RX ORDER — NALOXONE HYDROCHLORIDE 0.4 MG/ML
0.1 INJECTION, SOLUTION INTRAMUSCULAR; INTRAVENOUS; SUBCUTANEOUS AS NEEDED
Status: DISCONTINUED | OUTPATIENT
Start: 2018-02-07 | End: 2018-02-07 | Stop reason: HOSPADM

## 2018-02-07 RX ORDER — SODIUM CHLORIDE 0.9 % (FLUSH) 0.9 %
5-10 SYRINGE (ML) INJECTION EVERY 8 HOURS
Status: DISCONTINUED | OUTPATIENT
Start: 2018-02-07 | End: 2018-02-07 | Stop reason: HOSPADM

## 2018-02-07 RX ORDER — LIDOCAINE HYDROCHLORIDE 10 MG/ML
0.1 INJECTION INFILTRATION; PERINEURAL AS NEEDED
Status: DISCONTINUED | OUTPATIENT
Start: 2018-02-07 | End: 2018-02-07 | Stop reason: HOSPADM

## 2018-02-07 RX ORDER — SODIUM CHLORIDE 0.9 % (FLUSH) 0.9 %
5-10 SYRINGE (ML) INJECTION AS NEEDED
Status: DISCONTINUED | OUTPATIENT
Start: 2018-02-07 | End: 2018-02-07 | Stop reason: HOSPADM

## 2018-02-07 RX ORDER — DIPHENHYDRAMINE HYDROCHLORIDE 50 MG/ML
12.5 INJECTION, SOLUTION INTRAMUSCULAR; INTRAVENOUS
Status: DISCONTINUED | OUTPATIENT
Start: 2018-02-07 | End: 2018-02-07 | Stop reason: HOSPADM

## 2018-02-07 RX ORDER — OXYCODONE HYDROCHLORIDE 5 MG/1
10 TABLET ORAL
Status: DISCONTINUED | OUTPATIENT
Start: 2018-02-07 | End: 2018-02-07 | Stop reason: HOSPADM

## 2018-02-07 RX ORDER — LIDOCAINE HYDROCHLORIDE 20 MG/ML
INJECTION, SOLUTION EPIDURAL; INFILTRATION; INTRACAUDAL; PERINEURAL AS NEEDED
Status: DISCONTINUED | OUTPATIENT
Start: 2018-02-07 | End: 2018-02-07 | Stop reason: HOSPADM

## 2018-02-07 RX ORDER — OXYCODONE HYDROCHLORIDE 5 MG/1
5 TABLET ORAL
Status: DISCONTINUED | OUTPATIENT
Start: 2018-02-07 | End: 2018-02-07 | Stop reason: HOSPADM

## 2018-02-07 RX ORDER — SODIUM CHLORIDE, SODIUM LACTATE, POTASSIUM CHLORIDE, CALCIUM CHLORIDE 600; 310; 30; 20 MG/100ML; MG/100ML; MG/100ML; MG/100ML
100 INJECTION, SOLUTION INTRAVENOUS CONTINUOUS
Status: DISCONTINUED | OUTPATIENT
Start: 2018-02-07 | End: 2018-02-07 | Stop reason: HOSPADM

## 2018-02-07 RX ORDER — HYDROMORPHONE HYDROCHLORIDE 2 MG/ML
0.5 INJECTION, SOLUTION INTRAMUSCULAR; INTRAVENOUS; SUBCUTANEOUS
Status: DISCONTINUED | OUTPATIENT
Start: 2018-02-07 | End: 2018-02-07 | Stop reason: HOSPADM

## 2018-02-07 RX ORDER — MIDAZOLAM HYDROCHLORIDE 1 MG/ML
2 INJECTION, SOLUTION INTRAMUSCULAR; INTRAVENOUS
Status: DISCONTINUED | OUTPATIENT
Start: 2018-02-07 | End: 2018-02-07 | Stop reason: HOSPADM

## 2018-02-07 RX ORDER — FENTANYL CITRATE 50 UG/ML
INJECTION, SOLUTION INTRAMUSCULAR; INTRAVENOUS AS NEEDED
Status: DISCONTINUED | OUTPATIENT
Start: 2018-02-07 | End: 2018-02-07 | Stop reason: HOSPADM

## 2018-02-07 RX ORDER — CHLOROPROCAINE HYDROCHLORIDE 30 MG/ML
INJECTION, SOLUTION EPIDURAL; INFILTRATION; INTRACAUDAL; PERINEURAL AS NEEDED
Status: DISCONTINUED | OUTPATIENT
Start: 2018-02-07 | End: 2018-02-07 | Stop reason: HOSPADM

## 2018-02-07 RX ORDER — ONDANSETRON 2 MG/ML
4 INJECTION INTRAMUSCULAR; INTRAVENOUS ONCE
Status: DISCONTINUED | OUTPATIENT
Start: 2018-02-07 | End: 2018-02-07 | Stop reason: HOSPADM

## 2018-02-07 RX ORDER — CEFAZOLIN SODIUM/WATER 2 G/20 ML
2 SYRINGE (ML) INTRAVENOUS ONCE
Status: COMPLETED | OUTPATIENT
Start: 2018-02-07 | End: 2018-02-07

## 2018-02-07 RX ADMIN — OXYCODONE HYDROCHLORIDE 5 MG: 5 TABLET ORAL at 09:35

## 2018-02-07 RX ADMIN — CHLOROPROCAINE HYDROCHLORIDE 1.5 ML: 30 INJECTION, SOLUTION EPIDURAL; INFILTRATION; INTRACAUDAL; PERINEURAL at 07:31

## 2018-02-07 RX ADMIN — LIDOCAINE HYDROCHLORIDE 40 MG: 20 INJECTION, SOLUTION EPIDURAL; INFILTRATION; INTRACAUDAL; PERINEURAL at 07:33

## 2018-02-07 RX ADMIN — Medication 2 G: at 07:34

## 2018-02-07 RX ADMIN — FENTANYL CITRATE 25 MCG: 50 INJECTION, SOLUTION INTRAMUSCULAR; INTRAVENOUS at 07:31

## 2018-02-07 RX ADMIN — PROPOFOL 70 MG: 10 INJECTION, EMULSION INTRAVENOUS at 07:33

## 2018-02-07 RX ADMIN — SODIUM CHLORIDE, SODIUM LACTATE, POTASSIUM CHLORIDE, AND CALCIUM CHLORIDE 100 ML/HR: 600; 310; 30; 20 INJECTION, SOLUTION INTRAVENOUS at 06:15

## 2018-02-07 RX ADMIN — PROPOFOL 180 MCG/KG/MIN: 10 INJECTION, EMULSION INTRAVENOUS at 07:33

## 2018-02-07 NOTE — ANESTHESIA POSTPROCEDURE EVALUATION
Post-Anesthesia Evaluation and Assessment    Patient: James Crews MRN: [id number]  SSN: [id number]    YOB: 1970  Age: 56 y.o. Sex: male       Cardiovascular Function/Vital Signs  Visit Vitals    /87    Pulse 67    Temp 36.5 °C (97.7 °F)    Resp 16    Wt 108.9 kg (240 lb)    SpO2 97%    BMI 33.47 kg/m2       Patient is status post general anesthesia for Procedure(s):  LEFT KNEE ARTHROSCOPY WITH PARTIAL MEDIAL MENISCECTOMY & LYSIS OF ADHESIONS   . Nausea/Vomiting: None    Postoperative hydration reviewed and adequate. Pain:  Pain Scale 1: Visual (02/07/18 0809)  Pain Intensity 1: 0 (02/07/18 0809)   Managed    Neurological Status:   Neuro (WDL): Exceptions to WDL (02/07/18 0839)  Neuro  Neurologic State: Drowsy (02/07/18 4691)  Orientation Level: Oriented X4 (02/07/18 0839)  LUE Motor Response: Purposeful (02/07/18 0839)  LLE Motor Response: Pharmacologically paralyzed (02/07/18 0839)  RUE Motor Response: Purposeful (02/07/18 0839)  RLE Motor Response: Pharmacologically paralyzed (02/07/18 0839)   At baseline    Mental Status and Level of Consciousness: Arousable    Pulmonary Status:   O2 Device: Nasal cannula (02/07/18 0809)   Adequate oxygenation and airway patent    Complications related to anesthesia: None    Post-anesthesia assessment completed.  No concerns    Signed By: Yaquelin Campos MD     February 7, 2018

## 2018-02-07 NOTE — PERIOP NOTES
Pt is having some return of sensation and is able to move his legs and feet some. States his feet are still numb. Pt's wife at bedside.

## 2018-02-07 NOTE — OP NOTES
Operative Note    2/7/2018     Preoperative diagnosis:  Oth tear of medial meniscus, current injury, left knee, init [S83.242A]    Postoperative diagnosis: Left Knee Partial Medial Meniscus Tear, adhesions    Surgeon(s) and Role:     * Tressa Mayen MD - Primary     Assistant: none      Anesthesia: spinal    Tourniquet Time:   Total Tourniquet Time Documented:  Thigh (laterality) - 18 minutes  Total: Thigh (laterality) - 18 minutes     At 250 mmHg. Antibiotics: Ancef 2 gram IV    Procedures:  Procedure(s):  LEFT KNEE ARTHROSCOPY WITH PARTIAL MEDIAL MENISCECTOMY & LYSIS OF ADHESIONS      88032  [id number]    Findings:  1. EUA -   - lachman's and - pivot shift. Stable to varus and valgus. 2. PFJ - The patellar cartilage appeared normal but there were adhesions that were present after his prior surgery along both the medial and lateral aspect of the patella causing decreased patellar mobility. There was some fraying over the anterior medial femoral condyle   3. Medial Joint - posterior body and horn medial meniscus tear, cartilage appeared normal.   4. Lateral joint - lateral joint carilage appeared normal. The lateral meniscus was stable to probing and showed no tears present. 5. PCL - stable and intact  6. ACL -  stable and intact     Indications / Consent:   this is a patient with symptoms compatible with a medial meniscus tear. After previous discussions and treatments using both conservative and/or non-operative treatment options the patient elected to proceed with surgery due to continued symptoms. A review of the risks and benefits, including but not limited to infection, stiffness, injury to nerves and vessels, DVT, PE, MI, need for further operations and other anesthesia related risks was performed with the patient. After this review and the review of the likely outcome and potential complications of the procedure, preoperative verbal and written consents were obtained.   The operative procedure and postoperative course were discussed with the patient in detail and the extremity was marked by the patient and myself. Procedure:     the patient was given a spinal anesthetic, placed in a supine position, an EUA was performed and noted above. A lateral post was placed adjacent to the operative leg. The contralateral leg was padded appropriately and lay on the operating table. The surgical leg was prepped with ChloraPrep and draped in the standard fashion. Prior to the beginning of the procedure, a time-out was performed for correct surgical site identification as was marked during the pre-operative meeting. This was confirmed using the written consent and history/physical. Time-out for antibiotic dosing, timing and selection was also performed. An esmarch was used to exsanguinate the leg and the tourniquet was inflated to 250 mmhg. An incision was made and the scope was introduced anterolaterally and an anteromedial portal was developed with the use of spinal needle localization. The patellofemoral joint was viewed and the articular surfaces were normal except for the areas of adhesions that had developed in the patellofemoral compartment and suprapatellar compartment. These were debrided and released appropriately and his patellar mobility was improved. The medial and lateral gutters were reviewed and they were normal otherwise. The medial compartment was viewed and the articular surfaces were noted. The medial meniscus was probed and a complex type tear was present. It involved 15-20% of the meniscus. The notch was viewed and the ACL and PCL were normal.  The lateral compartment was viewed and probed and the articular surface and lateral meniscus was normal.  Attention was turned back to the medial compartment. The medial meniscus was resected and balanced with manual instruments and motorized kosta until the rim was probed and felt to be stable.   The posteromedial compartment was viewed and no further abnormalities were noted. The scope was then placed superiorly. Any other loose bits of meniscal debris were removed from the joint. Monofilament sutures were used to close the portals and a sterile Allevyn dressing and an Ace wrap were placed on the knee. The tourniquet was deflated at 18 min. The patient was returned to the recovery room in a satisfactory condition. Post-operative plan: Patient will work on ROM and may progress weightbearing as they feel comfortable. They will start PT for 2 or more visits depending on their progress. Otherwise the patient may refer to post op instructions for wound care and progression of activities. Enteric Aspirin was discussed for DVT prophylaxis. Will follow up in 1 week for wound check and post op review. Pain medications have been provided. Estimated Blood Loss:   minimal     Fluids:    See anesthesia record.     Implant: * No implants in log *    Closure: Primary    Complications: None    Signed By: Sirisha Carbajal MD

## 2018-02-07 NOTE — DISCHARGE INSTRUCTIONS
Postoperative Instructions - Knee Arthroscopy    Please note these are general instructions for postoperative care. Specific instructions may be given regarding the type of surgery that you have undergone. 1. Postoperative dressings may be removed after 2 to 3 days. Dressings should be kept dry for the first several days. Following removal of the dressing, wounds should be kept dry when showering. A large trash bag taped to the thigh can work well. Do not soak wounds in the tub prior to suture removal.      2. All steri-strip tapes across the wound should be left in place if your incisions have them. 3. Some discomfort is expected following any operation. You will be given a prescription for pain medication along with instructions for its use. Many pain medications contain Tylenol. Do not take additional Tylenol if you are currently taking the prescribed pain medications. Do not drive while taking pain medications. Do not make important personal or business decisions or sign legal documents the first 24 hours after surgery. If your pain is not adequately controlled, contact your surgeon on call at the numbers on this sheet. 4. Following simple knee arthroscopy crutches are not usually utilized or are used for only 1 or 2 days. Working on regaining full motion of the knee is encouraged. Bending and straightening the knee and performing ankle range of motion is encouraged to prevent blood clots. Try to use a stationary bike without resistance within the first week of surgery to help regain motion. 5. Elevating the lower extremity after surgery is helpful in the first few days postoperatively. This can help control swelling. Applying ice for 15 to 20 minutes per hour to the surgical site is often helpful in decreasing pain and swelling.     6. As pain subsides, discontinuation of narcotic medication is recommended and switching to Tylenol or over the counter anti-inflammatory medication is desirable. 7. Pain medications can cause constipation, nausea and vomiting, and sometimes itching and hives. Keeping hydrated by taking liquids on a regular basis can help. For constipation, consider over the counter additions like Metamucil or an over-the-counter stool softner. Trying to limit narcotic use can often help with regards to nausea and vomiting. Taking pain medication with a small amount of food is also usually helpful. Itching and hives can occur with pain medication as well as antibiotics that are given during the hermelindo-operative time. Over the counter Benadryl (25mg every 6 hours) can be helpful in treating itching. 8. If you feel you are progressing slowly, feel free to call our office to get you into physical therapy if you do not already have a prescription. 9. Any fevers (101º or greater) after 2-3 days post-op, increasing redness, drainage, or steadily increasing pain please notify us or come in as soon as possible. 10. Deep vein thrombosis (DVT) is a condition in which a blood clot has formed in a deep vein of the leg. This may result in redness, swelling, warmth and/or pain       of the leg. Although these are very rare, there are things that can be done to lessen the risk. - It is recommended by your surgeon unless indicated otherwise, after knee arthroscopy; take an adult aspirin once a day for three weeks after surgery to help prevent the formation of blood clots. (Do not take aspirin against the advice of your medical doctor). -   For several days (at least until you are walking about most of the day), rotate your  ankle, bend your toes and move your foot back and forth and from side to side, frequently (at least a few minutes every hour while you are awake) in order to keep blood circulation flowing so that the blood clotting will be less likely to occur. The more circulation, the less chance of blood clotting and a DVT.           Phone [phone]              Fax [phone]                                 After general anesthesia or intravenous sedation, for 24 hours or while taking prescription Narcotics:  · Limit your activities  · Do not drive and operate hazardous machinery  · Do not make important personal or business decisions  · Do  not drink alcoholic beverages  · If you have not urinated within 8 hours after discharge, please contact your surgeon on call. *  Please give a list of your current medications to your Primary Care Provider. *  Please update this list whenever your medications are discontinued, doses are      changed, or new medications (including over-the-counter products) are added. *  Please carry medication information at all times in case of emergency situations. These are general instructions for a healthy lifestyle:  No smoking/ No tobacco products/ Avoid exposure to second hand smoke  Surgeon General's Warning:  Quitting smoking now greatly reduces serious risk to your health. Obesity, smoking, and sedentary lifestyle greatly increases your risk for illness  A healthy diet, regular physical exercise & weight monitoring are important for maintaining a healthy lifestyle    You may be retaining fluid if you have a history of heart failure or if you experience any of the following symptoms:  Weight gain of 3 pounds or more overnight or 5 pounds in a week, increased swelling in our hands or feet or shortness of breath while lying flat in bed. Please call your doctor as soon as you notice any of these symptoms; do not wait until your next office visit. Recognize signs and symptoms of STROKE:  F-face looks uneven  A-arms unable to move or move unevenly  S-speech slurred or non-existent  T-time-call 911 as soon as signs and symptoms begin-DO NOT go       Back to bed or wait to see if you get better-TIME IS BRAIN.

## 2018-02-07 NOTE — ANESTHESIA PREPROCEDURE EVALUATION
Anesthetic History   No history of anesthetic complications            Review of Systems / Medical History  Patient summary reviewed and pertinent labs reviewed    Pulmonary        Sleep apnea: CPAP  Smoker (Quit 3 yrs ago)         Neuro/Psych         Psychiatric history     Cardiovascular    Hypertension: well controlled              Exercise tolerance: >4 METS  Comments: Denies CP, SOB or changes in functional status   GI/Hepatic/Renal     GERD: well controlled    Renal disease: stones       Endo/Other        Obesity     Other Findings            Physical Exam    Airway  Mallampati: II  TM Distance: 4 - 6 cm  Neck ROM: normal range of motion   Mouth opening: Normal     Cardiovascular    Rhythm: regular  Rate: normal         Dental    Dentition: Bridges and Caps/crowns     Pulmonary  Breath sounds clear to auscultation               Abdominal  GI exam deferred       Other Findings            Anesthetic Plan    ASA: 2  Anesthesia type: spinal          Induction: Intravenous  Anesthetic plan and risks discussed with: Patient and Spouse      Plan for LMA.  Cardiac arrest 12/17 in OR

## 2018-02-07 NOTE — IP AVS SNAPSHOT
[address] 
[phone] Patient: Spencer Dave MRN: [id number] OJJ:[id number] About your hospitalization You were admitted on:  February 7, 2018 You last received care in the:  [facility] OP PACU You were discharged on:  February 7, 2018 Why you were hospitalized Your primary diagnosis was:  Not on File Follow-up Information Follow up With Details Comments Contact Info Alin Atwood MD  Tuesday, February 20 @ 10:15 in the [address] [address] 
[phone] Discharge Orders None A check corey indicates which time of day the medication should be taken. My Medications ASK your doctor about these medications Instructions Each Dose to Equal  
 Morning Noon Evening Bedtime ADVIL 200 mg tablet Generic drug:  ibuprofen Your last dose was: Your next dose is: Take 200 mg by mouth as needed for Pain. Last dose 2/4/18  
 200 mg ALLEGRA 180 mg tablet Generic drug:  fexofenadine Your last dose was: Your next dose is: Take  by mouth daily as needed for Allergies. citalopram 10 mg tablet Commonly known as:  Ritesh Garcia Your last dose was: Your next dose is: Take 10 mg by mouth daily. Indications: anxiety 10 mg EXFORGE 5-160 mg per tablet Generic drug:  amLODIPine-valsartan Your last dose was: Your next dose is: Take 1 Tab by mouth daily. Indications: hypertension 1 Tab SUDAFED 30 mg tablet Generic drug:  pseudoephedrine Your last dose was: Your next dose is: Take 30 mg by mouth every four (4) hours as needed for Congestion. 30 mg  
    
   
   
   
  
 ZANTAC 150 mg tablet Generic drug:  raNITIdine Your last dose was: Your next dose is: Take 150 mg by mouth as needed for Indigestion. 150 mg Discharge Instructions Postoperative Instructions - Knee Arthroscopy Please note these are general instructions for postoperative care. Specific instructions may be given regarding the type of surgery that you have undergone. 1. Postoperative dressings may be removed after 2 to 3 days. Dressings should be kept dry for the first several days. Following removal of the dressing, wounds should be kept dry when showering. A large trash bag taped to the thigh can work well. Do not soak wounds in the tub prior to suture removal.   
 
2. All steri-strip tapes across the wound should be left in place if your incisions have them. 3. Some discomfort is expected following any operation. You will be given a prescription for pain medication along with instructions for its use. Many pain medications contain Tylenol. Do not take additional Tylenol if you are currently taking the prescribed pain medications. Do not drive while taking pain medications. Do not make important personal or business decisions or sign legal documents the first 24 hours after surgery. If your pain is not adequately controlled, contact your surgeon on call at the numbers on this sheet. 4. Following simple knee arthroscopy crutches are not usually utilized or are used for only 1 or 2 days. Working on regaining full motion of the knee is encouraged. Bending and straightening the knee and performing ankle range of motion is encouraged to prevent blood clots. Try to use a stationary bike without resistance within the first week of surgery to help regain motion. 5. Elevating the lower extremity after surgery is helpful in the first few days postoperatively. This can help control swelling.   Applying ice for 15 to 20 minutes per hour to the surgical site is often helpful in decreasing pain and swelling. 6. As pain subsides, discontinuation of narcotic medication is recommended and switching to Tylenol or over the counter anti-inflammatory medication is desirable. 7. Pain medications can cause constipation, nausea and vomiting, and sometimes itching and hives. Keeping hydrated by taking liquids on a regular basis can help. For constipation, consider over the counter additions like Metamucil or an over-the-counter stool softner. Trying to limit narcotic use can often help with regards to nausea and vomiting. Taking pain medication with a small amount of food is also usually helpful. Itching and hives can occur with pain medication as well as antibiotics that are given during the hermelindo-operative time. Over the counter Benadryl (25mg every 6 hours) can be helpful in treating itching. 8. If you feel you are progressing slowly, feel free to call our office to get you into physical therapy if you do not already have a prescription. 9. Any fevers (101º or greater) after 2-3 days post-op, increasing redness, drainage, or steadily increasing pain please notify us or come in as soon as possible. 10. Deep vein thrombosis (DVT) is a condition in which a blood clot has formed in a deep vein of the leg. This may result in redness, swelling, warmth and/or pain       of the leg. Although these are very rare, there are things that can be done to lessen the risk. - It is recommended by your surgeon unless indicated otherwise, after knee arthroscopy; take an adult aspirin once a day for three weeks after surgery to help prevent the formation of blood clots. (Do not take aspirin against the advice of your medical doctor).   
 
  -   For several days (at least until you are walking about most of the day), rotate your  ankle, bend your toes and move your foot back and forth and from side to side, frequently (at least a few minutes every hour while you are awake) in order to keep blood circulation flowing so that the blood clotting will be less likely to occur. The more circulation, the less chance of blood clotting and a DVT. Phone [phone] Fax [phone] After general anesthesia or intravenous sedation, for 24 hours or while taking prescription Narcotics: · Limit your activities · Do not drive and operate hazardous machinery · Do not make important personal or business decisions · Do  not drink alcoholic beverages · If you have not urinated within 8 hours after discharge, please contact your surgeon on call. *  Please give a list of your current medications to your Primary Care Provider. *  Please update this list whenever your medications are discontinued, doses are 
    changed, or new medications (including over-the-counter products) are added. *  Please carry medication information at all times in case of emergency situations. These are general instructions for a healthy lifestyle: No smoking/ No tobacco products/ Avoid exposure to second hand smoke Surgeon General's Warning:  Quitting smoking now greatly reduces serious risk to your health. Obesity, smoking, and sedentary lifestyle greatly increases your risk for illness A healthy diet, regular physical exercise & weight monitoring are important for maintaining a healthy lifestyle You may be retaining fluid if you have a history of heart failure or if you experience any of the following symptoms:  Weight gain of 3 pounds or more overnight or 5 pounds in a week, increased swelling in our hands or feet or shortness of breath while lying flat in bed. Please call your doctor as soon as you notice any of these symptoms; do not wait until your next office visit. Recognize signs and symptoms of STROKE: 
F-face looks uneven A-arms unable to move or move unevenly S-speech slurred or non-existent T-time-call 911 as soon as signs and symptoms begin-DO NOT go Back to bed or wait to see if you get better-TIME IS BRAIN. Introducing [facility]! Alfonso Duff introduces [employer] patient portal. Now you can access parts of your medical record, email your doctor's office, and request medication refills online. 1. In your internet browser, go to [email or web address] the Bet. [email or web address] the Bet 2. Click on the First Time User? Click Here link in the Sign In box. You will see the New Member Sign Up page. 3. Enter your [employer] Access Code exactly as it appears below. You will not need to use this code after youve completed the sign-up process. If you do not sign up before the expiration date, you must request a new code. · [employer] Access Code: [email or web address] Expires: 4/30/2018 12:36 PM 
 
4. Enter the last four digits of your Social Security Number (xxxx) and Date of Birth (mm/dd/yyyy) as indicated and click Submit. You will be taken to the next sign-up page. 5. Create a [employer] ID. This will be your [employer] login ID and cannot be changed, so think of one that is secure and easy to remember. 6. Create a [employer] password. You can change your password at any time. 7. Enter your Password Reset Question and Answer. This can be used at a later time if you forget your password. 8. Enter your e-mail address. You will receive e-mail notification when new information is available in [address] 9. Click Sign Up. You can now view and download portions of your medical record. 10. Click the Download Summary menu link to download a portable copy of your medical information. If you have questions, please visit the Frequently Asked Questions section of the [employer] website. Remember, [employer] is NOT to be used for urgent needs. For medical emergencies, dial 911. Now available from your iPhone and Android! Providers Seen During Your Hospitalization Provider Specialty Primary office phone Nicole Quan MD Orthopedic Surgery [phone] Your Primary Care Physician (PCP) Primary Care Physician Office Phone Office Fax Emely Figueroa [phone] You are allergic to the following Allergen Reactions Pcn (Penicillins) Rash Recent Documentation Weight BMI Smoking Status 108.9 kg 33.47 kg/m2 Former Smoker Emergency Contacts Name Discharge Info Relation Home Work Mobile Tanya Cotton  Spouse [3] [phone] Patient Belongings The following personal items are in your possession at time of discharge: 
  Dental Appliances: None         Home Medications: None   Jewelry: None  Clothing: Footwear, Pants, Shirt    Other Valuables: None Please provide this summary of care documentation to your next provider. Signatures-by signing, you are acknowledging that this After Visit Summary has been reviewed with you and you have received a copy. Patient Signature:  ____________________________________________________________ Date:  ____________________________________________________________  
  
Maryana Miramontes Provider Signature:  ____________________________________________________________ Date:  ____________________________________________________________

## 2018-02-07 NOTE — PERIOP NOTES
PACU DISCHARGE NOTE    Vital signs stable, pain well controlled, alert and oriented times three or at baseline, follow up per surgeon, no anesthetic complications. Discharge instructions given to pt and his wife. Both voice understanding of instructions. Pt is tolerating PO and had his IV removed intact. Pt's wife, Arely Dallas, has pt's discharge prescription for Oxycodone and pt's belongings. Pt to discharge door via wheelchair and left in care of his wife.

## 2018-02-07 NOTE — ANESTHESIA PROCEDURE NOTES
Spinal Block    Start time: 2/7/2018 7:27 AM  End time: 2/7/2018 7:31 AM  Performed by: Luba Simmonds  Authorized by: Luba Simmonds     Pre-procedure: Indications: primary anesthetic  Preanesthetic Checklist: patient identified, risks and benefits discussed, anesthesia consent, site marked, patient being monitored and timeout performed    Timeout Time: 07:27          Spinal Block:   Patient Position:  Seated  Prep Region:  Lumbar  Prep: chlorhexidine      Location:  L3-4  Technique:  Single shot  Local:  Lidocaine 1%  Local Dose (mL):  3    Needle:   Needle Type:  Pencan  Needle Gauge:  25 G  Attempts:  1      Events: CSF confirmed, no blood with aspiration and no paresthesia        Assessment:  Insertion:  Uncomplicated  Patient tolerance:  Patient tolerated the procedure well with no immediate complications  Anesthesiology Spinal Procedure Note    Risks and benefits were discussed with patient and plans are to proceed. Patient was placed in the sitting position. The back was prepped at the lumbar region with Chlorhexidine. 1% xylocaine was used as local at L3-L4. A  25g Radha was passed 2 times. Easy aspiration of CSF was noted. 45 mg hyperbaric Chloroprocaine and 25micrograms  fentanyl/SUBLIMAZE was injected.

## 2018-02-07 NOTE — H&P
Outpatient Surgery History and Physical:  Guerita Gaming was seen and examined. CHIEF COMPLAINT:   Left knee. PE:     Visit Vitals    BP (!) 160/91 (BP 1 Location: Right arm, BP Patient Position: Sitting)    Pulse 75    Temp 98.4 °F (36.9 °C)    Resp 16    Wt 108.9 kg (240 lb)    SpO2 95%    BMI 33.47 kg/m2       Heart:   Regular rhythm      Lungs:  Are clear      Past Medical History:    Patient Active Problem List    Diagnosis    Bradycardia    Obstructive sleep apnea syndrome    Abnormal EKG    Anxiety    Hypertension       Surgical History:   Past Surgical History:   Procedure Laterality Date    HX COLONOSCOPY      HX ENDOSCOPY      HX HEENT      adenoid, polyps from throat removed     HX ORTHOPAEDIC      HX TONSILLECTOMY  at age of 7       Social History: Patient  reports that he has quit smoking. He has a 20.00 pack-year smoking history. He has never used smokeless tobacco. He reports that he drinks alcohol. He reports that he does not use illicit drugs. Family History:   Family History   Problem Relation Age of Onset    Hypertension Mother     Arthritis-osteo Mother     Diabetes Father     Heart Disease Father      stents- no MI    No Known Problems Sister     No Known Problems Brother        Allergies: Reviewed per EMR  Allergies   Allergen Reactions    Pcn [Penicillins] Rash       Medications:    No current facility-administered medications on file prior to encounter. Current Outpatient Prescriptions on File Prior to Encounter   Medication Sig    citalopram (CELEXA) 10 mg tablet Take 10 mg by mouth daily. Indications: anxiety    amLODIPine-valsartan (EXFORGE) 5-160 mg per tablet Take 1 Tab by mouth daily. Indications: hypertension    ibuprofen (ADVIL) 200 mg tablet Take 200 mg by mouth as needed for Pain. Last dose 2/4/18    fexofenadine (ALLEGRA) 180 mg tablet Take  by mouth daily as needed for Allergies. The surgery is planned for the left knee. History and physical has been reviewed. The patient has been examined. There have been no significant clinical changes since the completion of the originally updated History and Physical. He has seen Dr. Mervat Heredia from cardiology and is ready to proceed. Patient identified by surgeon; surgical site was confirmed by patient and surgeon. The patient is here today for outpatient surgery. I have examined the patient, no changes are noted in the patient's medical status. Necessity for the procedure/care is still present and the history and physical above is current. See the office notes for the full long term history of the problem. Please see the recent office notes for the musculoskeletal examination.     Signed By: Venus Phelps MD     February 7, 2018 6:59 AM

## 2018-02-07 NOTE — PROGRESS NOTES
Spiritual Care visit. Initial Visit, Pre Surgery Consult. Visit and prayer before patient goes to surgery.     Visit by Miriam Clark M.Ed., Th.B. ,Staff

## 2018-02-07 NOTE — PERIOP NOTES
Pt states he has full sensation in his legs and feet. Pt sat up on the side of the bed, stood up, and took a few steps without any issues. Pt up to dress.

## 2018-02-07 NOTE — BRIEF OP NOTE
BRIEF OPERATIVE NOTE    Date of Procedure: 2/7/2018   Preoperative Diagnosis: Oth tear of medial meniscus, current injury, left knee, init [S83.242A]  Postoperative Diagnosis: Left Knee Partial Medial Meniscus Tear, adhesions  Procedure(s):  LEFT KNEE ARTHROSCOPY WITH PARTIAL MEDIAL MENISCECTOMY & LYSIS OF ADHESIONS     Surgeon(s) and Role:     * Alena Hung MD - Primary         Assistant Staff: None      Surgical Staff:  Circ-1: Jaymie Crabtree RN  Scrub Tech-1: Perla Tilley  Scrub Tech-2: Satinder Sims  Event Time In   Incision Start 0892   Incision Close 0757     Anesthesia: General   Estimated Blood Loss: minimal  Specimens: * No specimens in log *   Findings: left knee medial meniscus tear, patellofemoral adhesions   Complications: none  Implants: * No implants in log *

## 2020-08-05 NOTE — BRIEF OP NOTE
BRIEF OPERATIVE NOTE    Date of Procedure: 10/11/2017   Preoperative Diagnosis: Other tear of medial meniscus of left knee, unspecified whether old or current tear, initial encounter [K51.249K]  Postoperative Diagnosis: Left Knee pain  Procedure(s):  LEFT KNEE DIAGNOSTIC ARTHROSCOPY   Surgeon(s) and Role:     * Simran Hansen MD - Primary         Assistant Staff:       Surgical Staff:  Circ-1: Kaitlin Jennings RN  Scrub Tech-1: Jordi Whalen  Event Time In   Incision Start 0703   Incision Close 0710     Anesthesia: General   Estimated Blood Loss: 1 ml  Specimens: * No specimens in log *   Findings: see op note.    Complications: cardiac arrhythmia   Implants: * No implants in log *
No

## (undated) DEVICE — SET IRRIG W 96IN TBNG 4 LN FLX BG

## (undated) DEVICE — Device

## (undated) DEVICE — (D)STRIP SKN CLSR 0.5X4IN WHT --

## (undated) DEVICE — NDL SPNE QNCKE 18GX3.5IN LF --

## (undated) DEVICE — SUTURE MCRYL SZ 3-0 L27IN ABSRB UD L19MM PS-2 3/8 CIR PRIM Y427H

## (undated) DEVICE — STOCKINETTE,IMPERVIOUS,12X48,STERILE: Brand: MEDLINE

## (undated) DEVICE — T-DRAPE,EXTREMITY,STERILE: Brand: MEDLINE

## (undated) DEVICE — DRAPE,U/SHT,SPLIT,FILM,60X84,STERILE: Brand: MEDLINE

## (undated) DEVICE — SURGICAL PROCEDURE PACK BASIC ST FRANCIS

## (undated) DEVICE — 3M™ COBAN™ NL STERILE NON-LATEX SELF-ADHERENT WRAP, 2086S, 6 IN X 5 YD (15 CM X 4,5 M), 12 ROLLS/CASE: Brand: 3M™ COBAN™

## (undated) DEVICE — SOFT SILICONE HYDROCELLULAR FOAM DRESSING WITH LOCK AWAY LAYER: Brand: ALLEVYN LIFE XL 21X21 CTN10

## (undated) DEVICE — BANDAGE COMPR SELF ADH 5 YDX6 IN TAN STRL PREMIERPRO LF

## (undated) DEVICE — (D)PREP SKN CHLRAPRP APPL 26ML -- CONVERT TO ITEM 371833

## (undated) DEVICE — BLADE SHV CUT MENIS AGG + 4MM --

## (undated) DEVICE — INTENDED FOR TISSUE SEPARATION, AND OTHER PROCEDURES THAT REQUIRE A SHARP SURGICAL BLADE TO PUNCTURE OR CUT.: Brand: BARD-PARKER ® STAINLESS STEEL BLADES

## (undated) DEVICE — SINGLE PORT MANIFOLD

## (undated) DEVICE — STERILE HOOK LOCK LATEX FREE ELASTIC BANDAGE 6INX5YD: Brand: HOOK LOCK™

## (undated) DEVICE — TRNQT RMFG CUFF W LCK CON 30IN --

## (undated) DEVICE — ZIMMER® STERILE DISPOSABLE TOURNIQUET CUFF WITH PLC, DUAL PORT, SINGLE BLADDER, 30 IN. (76 CM)

## (undated) DEVICE — SOLUTION IRRIG 3000ML 0.9% SOD CHL FLX CONT 0797208] ICU MEDICAL INC]

## (undated) DEVICE — SYR LR LCK 1ML GRAD NSAF 30ML --

## (undated) DEVICE — SET IRRIG DST FLX M CONN

## (undated) DEVICE — SINGLE PORT MANIFOLD: Brand: NEPTUNE 2